# Patient Record
Sex: MALE | Race: WHITE | NOT HISPANIC OR LATINO | Employment: FULL TIME | URBAN - METROPOLITAN AREA
[De-identification: names, ages, dates, MRNs, and addresses within clinical notes are randomized per-mention and may not be internally consistent; named-entity substitution may affect disease eponyms.]

---

## 2017-05-05 ENCOUNTER — APPOINTMENT (OUTPATIENT)
Dept: PHYSICAL THERAPY | Facility: CLINIC | Age: 57
End: 2017-05-05
Payer: COMMERCIAL

## 2017-05-05 PROCEDURE — 97162 PT EVAL MOD COMPLEX 30 MIN: CPT

## 2017-05-08 ENCOUNTER — APPOINTMENT (OUTPATIENT)
Dept: PHYSICAL THERAPY | Facility: CLINIC | Age: 57
End: 2017-05-08
Payer: COMMERCIAL

## 2017-05-08 PROCEDURE — 97110 THERAPEUTIC EXERCISES: CPT

## 2017-05-08 PROCEDURE — 97140 MANUAL THERAPY 1/> REGIONS: CPT

## 2017-05-12 ENCOUNTER — APPOINTMENT (OUTPATIENT)
Dept: PHYSICAL THERAPY | Facility: CLINIC | Age: 57
End: 2017-05-12
Payer: COMMERCIAL

## 2017-05-12 PROCEDURE — 97110 THERAPEUTIC EXERCISES: CPT

## 2017-05-12 PROCEDURE — 97140 MANUAL THERAPY 1/> REGIONS: CPT

## 2017-05-16 ENCOUNTER — APPOINTMENT (OUTPATIENT)
Dept: PHYSICAL THERAPY | Facility: CLINIC | Age: 57
End: 2017-05-16
Payer: COMMERCIAL

## 2017-05-16 PROCEDURE — 97140 MANUAL THERAPY 1/> REGIONS: CPT

## 2017-05-16 PROCEDURE — 97110 THERAPEUTIC EXERCISES: CPT

## 2017-05-19 ENCOUNTER — APPOINTMENT (OUTPATIENT)
Dept: PHYSICAL THERAPY | Facility: CLINIC | Age: 57
End: 2017-05-19
Payer: COMMERCIAL

## 2017-05-23 ENCOUNTER — APPOINTMENT (OUTPATIENT)
Dept: PHYSICAL THERAPY | Facility: CLINIC | Age: 57
End: 2017-05-23
Payer: COMMERCIAL

## 2017-05-23 PROCEDURE — 97110 THERAPEUTIC EXERCISES: CPT

## 2017-05-23 PROCEDURE — 97140 MANUAL THERAPY 1/> REGIONS: CPT

## 2017-05-25 ENCOUNTER — APPOINTMENT (OUTPATIENT)
Dept: PHYSICAL THERAPY | Facility: CLINIC | Age: 57
End: 2017-05-25
Payer: COMMERCIAL

## 2017-05-25 PROCEDURE — 97110 THERAPEUTIC EXERCISES: CPT

## 2017-05-25 PROCEDURE — 97140 MANUAL THERAPY 1/> REGIONS: CPT

## 2017-05-30 ENCOUNTER — APPOINTMENT (OUTPATIENT)
Dept: PHYSICAL THERAPY | Facility: CLINIC | Age: 57
End: 2017-05-30
Payer: COMMERCIAL

## 2017-05-30 PROCEDURE — 97110 THERAPEUTIC EXERCISES: CPT

## 2017-05-30 PROCEDURE — 97140 MANUAL THERAPY 1/> REGIONS: CPT

## 2017-06-01 ENCOUNTER — APPOINTMENT (OUTPATIENT)
Dept: PHYSICAL THERAPY | Facility: CLINIC | Age: 57
End: 2017-06-01
Payer: COMMERCIAL

## 2017-06-01 PROCEDURE — 97140 MANUAL THERAPY 1/> REGIONS: CPT

## 2017-06-01 PROCEDURE — 97110 THERAPEUTIC EXERCISES: CPT

## 2017-06-06 ENCOUNTER — APPOINTMENT (OUTPATIENT)
Dept: PHYSICAL THERAPY | Facility: CLINIC | Age: 57
End: 2017-06-06
Payer: COMMERCIAL

## 2017-06-06 PROCEDURE — 97140 MANUAL THERAPY 1/> REGIONS: CPT

## 2017-06-06 PROCEDURE — 97110 THERAPEUTIC EXERCISES: CPT

## 2017-06-08 ENCOUNTER — APPOINTMENT (OUTPATIENT)
Dept: PHYSICAL THERAPY | Facility: CLINIC | Age: 57
End: 2017-06-08
Payer: COMMERCIAL

## 2017-06-08 PROCEDURE — 97110 THERAPEUTIC EXERCISES: CPT

## 2017-06-08 PROCEDURE — 97140 MANUAL THERAPY 1/> REGIONS: CPT

## 2017-06-13 ENCOUNTER — APPOINTMENT (OUTPATIENT)
Dept: PHYSICAL THERAPY | Facility: CLINIC | Age: 57
End: 2017-06-13
Payer: COMMERCIAL

## 2017-06-13 PROCEDURE — 97110 THERAPEUTIC EXERCISES: CPT

## 2017-06-13 PROCEDURE — 97140 MANUAL THERAPY 1/> REGIONS: CPT

## 2017-06-15 ENCOUNTER — APPOINTMENT (OUTPATIENT)
Dept: PHYSICAL THERAPY | Facility: CLINIC | Age: 57
End: 2017-06-15
Payer: COMMERCIAL

## 2017-06-15 PROCEDURE — 97140 MANUAL THERAPY 1/> REGIONS: CPT

## 2017-06-15 PROCEDURE — 97110 THERAPEUTIC EXERCISES: CPT

## 2017-06-20 ENCOUNTER — APPOINTMENT (OUTPATIENT)
Dept: PHYSICAL THERAPY | Facility: CLINIC | Age: 57
End: 2017-06-20
Payer: COMMERCIAL

## 2017-06-20 PROCEDURE — 97110 THERAPEUTIC EXERCISES: CPT

## 2017-06-20 PROCEDURE — 97140 MANUAL THERAPY 1/> REGIONS: CPT

## 2017-06-22 ENCOUNTER — APPOINTMENT (OUTPATIENT)
Dept: PHYSICAL THERAPY | Facility: CLINIC | Age: 57
End: 2017-06-22
Payer: COMMERCIAL

## 2017-06-27 ENCOUNTER — APPOINTMENT (OUTPATIENT)
Dept: PHYSICAL THERAPY | Facility: CLINIC | Age: 57
End: 2017-06-27
Payer: COMMERCIAL

## 2017-06-29 ENCOUNTER — APPOINTMENT (OUTPATIENT)
Dept: PHYSICAL THERAPY | Facility: CLINIC | Age: 57
End: 2017-06-29
Payer: COMMERCIAL

## 2017-07-05 ENCOUNTER — APPOINTMENT (OUTPATIENT)
Dept: PHYSICAL THERAPY | Facility: CLINIC | Age: 57
End: 2017-07-05
Payer: COMMERCIAL

## 2017-07-05 PROCEDURE — 97110 THERAPEUTIC EXERCISES: CPT

## 2017-07-05 PROCEDURE — 97140 MANUAL THERAPY 1/> REGIONS: CPT

## 2017-07-07 ENCOUNTER — APPOINTMENT (OUTPATIENT)
Dept: PHYSICAL THERAPY | Facility: CLINIC | Age: 57
End: 2017-07-07
Payer: COMMERCIAL

## 2017-07-07 PROCEDURE — 97110 THERAPEUTIC EXERCISES: CPT

## 2017-07-07 PROCEDURE — 97140 MANUAL THERAPY 1/> REGIONS: CPT

## 2017-07-11 ENCOUNTER — APPOINTMENT (OUTPATIENT)
Dept: PHYSICAL THERAPY | Facility: CLINIC | Age: 57
End: 2017-07-11
Payer: COMMERCIAL

## 2017-07-13 ENCOUNTER — APPOINTMENT (OUTPATIENT)
Dept: PHYSICAL THERAPY | Facility: CLINIC | Age: 57
End: 2017-07-13
Payer: COMMERCIAL

## 2017-07-13 PROCEDURE — 97140 MANUAL THERAPY 1/> REGIONS: CPT

## 2017-07-13 PROCEDURE — 97110 THERAPEUTIC EXERCISES: CPT

## 2017-07-14 ENCOUNTER — APPOINTMENT (OUTPATIENT)
Dept: PHYSICAL THERAPY | Facility: CLINIC | Age: 57
End: 2017-07-14
Payer: COMMERCIAL

## 2017-07-18 ENCOUNTER — APPOINTMENT (OUTPATIENT)
Dept: PHYSICAL THERAPY | Facility: CLINIC | Age: 57
End: 2017-07-18
Payer: COMMERCIAL

## 2017-07-18 PROCEDURE — 97110 THERAPEUTIC EXERCISES: CPT

## 2017-07-18 PROCEDURE — 97140 MANUAL THERAPY 1/> REGIONS: CPT

## 2017-07-20 ENCOUNTER — APPOINTMENT (OUTPATIENT)
Dept: PHYSICAL THERAPY | Facility: CLINIC | Age: 57
End: 2017-07-20
Payer: COMMERCIAL

## 2017-07-20 PROCEDURE — 97140 MANUAL THERAPY 1/> REGIONS: CPT

## 2017-07-20 PROCEDURE — 97110 THERAPEUTIC EXERCISES: CPT

## 2017-07-25 ENCOUNTER — APPOINTMENT (OUTPATIENT)
Dept: PHYSICAL THERAPY | Facility: CLINIC | Age: 57
End: 2017-07-25
Payer: COMMERCIAL

## 2017-07-25 PROCEDURE — 97110 THERAPEUTIC EXERCISES: CPT

## 2017-07-25 PROCEDURE — 97140 MANUAL THERAPY 1/> REGIONS: CPT

## 2017-07-27 ENCOUNTER — APPOINTMENT (OUTPATIENT)
Dept: PHYSICAL THERAPY | Facility: CLINIC | Age: 57
End: 2017-07-27
Payer: COMMERCIAL

## 2017-07-27 PROCEDURE — 97140 MANUAL THERAPY 1/> REGIONS: CPT

## 2017-07-27 PROCEDURE — 97110 THERAPEUTIC EXERCISES: CPT

## 2018-01-18 NOTE — MISCELLANEOUS
Message   Recorded as Task   Date: 05/05/2016 02:12 PM, Created By: Phong Coello   Task Name: Miscellaneous   Assigned To: [address] [facility], [address] [facility] Clinical,Team   Regarding Patient: Aleda Carrel, Status: Active   CommentJessica Scherer - 05 May 2016 2:12 PM     TASK CREATED  patient called and is returning your call from last week  Please call him  thank you   Rudi Schirmer - 05 May 2016 2:22 PM     TASK EDITED  Spoke to pt post R L5 AND S1 TFESI done on 4/22/16 with Dr Vazquez  Pt states that he has only 20% improvement in pain relief  Prior to the injection tramadol didn't touch the pain, since the injection the tramadol is taking the edge off  Pt inquiring whether Dr Vazquez will refill his tramadol or if he should have his PCP refill it  Instructed pt to contact his PCP since Dr Vazquez is not prescribing tramadol  Pt would be interested in trying another injection as he knows the next step is surgery and would like to repeat the injection first if Dr Vazquez recommends to  [facility] [employer] [address] - 05 May 2016 2:58 PM     TASK REPLIED TO: Previously Assigned To [address] [place] [address] to schedule 2nd injection at least 3 weeks from the date of the first injection  I would be willing to prescribe tramadol for him too if he does UDT and signs opioid agreement  [facility] - 06 May 2016 8:50 AM     TASK EDITED  S/W pt and advised of the same  Pt said he already made an appt today at 12:30 about the tramadol with his PCP  Pt was scheduled for right L5-S1 TFESI #2 for 5/13/16 at [address] [place] [address] at [facility]  Instr reviewed:  light brkf allowed then NPO 1 Hr prior to opro,  needed, denies blood thinners, c/b needed if sick/abx started prior to opro  Pt verbalized understanding of all instr  [facility] [employer] [address] - 06 May 2016 9:22 AM     TASK REPLIED TO: Previously Assigned To [place]  Thanks  Active Problems    1  Anxiety disorder (300 00) (F41 9)   2  Back pain (724 5) (M54 9)   3   Backache (724 5) (M54 9)   4  Benign essential hypertension (401 1) (I10)   5  Chronic lumbar pain (724 2,338 29) (M54 5,G89 29)   6  Chronic pain syndrome (338 4) (G89 4)   7  Essential hypertension (401 9) (I10)   8  Lumbar degenerative disc disease (722 52) (M51 36)   9  Lumbar disc herniation with radiculopathy (722 10) (M51 16)   10  Lumbar herniated disc (722 10) (M51 26)    Current Meds   1  ALPRAZolam 0 5 MG Oral Tablet; 1 every 8 hours prn; Therapy: 50THJ3555 to  Requested for: 40IAR5666 Recorded   2  Atenolol 100 MG Oral Tablet; 1 Every Day; Therapy: 85NBB1613 to  Requested for: 56TRQ7925 Recorded   3  Atenolol 100 MG Oral Tablet; Therapy: 85BYB1809 to (Last Cecy Rider)  Requested for: 25Mar2012 Ordered   4  Atenolol TABS; Therapy: (Recorded:03Mar2016) to Recorded   5  Cialis 20 MG Oral Tablet; 1 BY MOUTH PRE EPISODE;   Therapy: 07SGF9907 to  Requested for: 90OYB5778 Recorded   6  ClonazePAM 1 MG Oral Tablet; 1 po q 12 hours prn; Therapy: 61VNB8850 to  Requested for: 88LQT9000 Recorded   7  ClonazePAM 1 MG Oral Tablet; Therapy: 72TAX9078 to (Last Rx:09Mar2012)  Requested for: 00VYB3489 Ordered   8  Clotrimazole-Betamethasone 1-0 05 % External Cream; APPLY TWICE  A DAY FOR 10   DAYS; Therapy: 71UCC2506 to  Requested for: 84OSL5900 Recorded   9  Diclofenac Sodium 75 MG Oral Tablet Delayed Release; Take 1 tablet twice daily; Therapy: 69Psa8605 to (Last Rx:14Apr2016)  Requested for: 27Fdk9296 Ordered   10  Fluticasone Propionate 50 MCG/ACT Nasal Suspension; Therapy: 55Jzh5923 to (Last Eran Morin)  Requested for: 52Kas1399 Ordered   11  Gabapentin 300 MG Oral Capsule; TAKE 1 CAPSULE AT BEDTIME; Therapy: 09VOV7421 to (Evaluate:71Fkm9258); Last Rx:03Mar2016 Ordered   12  Ketoconazole 2 % External Cream;    Therapy: 79BUP6685 to (Last Rx:34Cce1935)  Requested for: 06Ntk3916 Ordered   13  Medrol (Isaac) 4 MG TABS (MethylPREDNISolone (Isaac)); TAKE AS DIRECTED ON    PATIENT INSTRUCTION CARD;     Therapy: 28OBM9034 to (Last Rx:03Mar2016) Ordered   14  TraMADol HCl - 50 MG Oral Tablet; Therapy: 72KIA5629 to (Last Rx:14Mar2012)  Requested for: 87ZLW7565 Ordered   15  Tramadol-Acetaminophen 37 5-325 MG Oral Tablet; 1 po q 6 hours prn; Therapy: 96BYI8437 to  Requested for: 43AWU0490 Recorded    Allergies    1   No Known Drug Allergies    Signatures   Electronically signed by : Muriel Mcdonald, ; May  6 2016 11:10AM EST                       (Author)

## 2021-07-08 ENCOUNTER — OFFICE VISIT (OUTPATIENT)
Dept: FAMILY MEDICINE CLINIC | Facility: CLINIC | Age: 61
End: 2021-07-08
Payer: COMMERCIAL

## 2021-07-08 VITALS
RESPIRATION RATE: 16 BRPM | HEART RATE: 63 BPM | TEMPERATURE: 97.3 F | SYSTOLIC BLOOD PRESSURE: 160 MMHG | OXYGEN SATURATION: 99 % | DIASTOLIC BLOOD PRESSURE: 92 MMHG | WEIGHT: 207 LBS | BODY MASS INDEX: 28.98 KG/M2 | HEIGHT: 71 IN

## 2021-07-08 DIAGNOSIS — J30.2 SEASONAL ALLERGIES: ICD-10-CM

## 2021-07-08 DIAGNOSIS — Z11.59 NEED FOR HEPATITIS C SCREENING TEST: ICD-10-CM

## 2021-07-08 DIAGNOSIS — Z13.6 SCREENING FOR CARDIOVASCULAR CONDITION: ICD-10-CM

## 2021-07-08 DIAGNOSIS — Z11.4 SCREENING FOR HIV (HUMAN IMMUNODEFICIENCY VIRUS): ICD-10-CM

## 2021-07-08 DIAGNOSIS — I10 BENIGN ESSENTIAL HYPERTENSION: Primary | ICD-10-CM

## 2021-07-08 DIAGNOSIS — Z12.5 SCREENING FOR PROSTATE CANCER: ICD-10-CM

## 2021-07-08 DIAGNOSIS — Z12.11 SCREEN FOR COLON CANCER: ICD-10-CM

## 2021-07-08 PROBLEM — N52.9 VASCULOGENIC ERECTILE DYSFUNCTION: Status: ACTIVE | Noted: 2021-07-08

## 2021-07-08 PROCEDURE — 99203 OFFICE O/P NEW LOW 30 MIN: CPT | Performed by: FAMILY MEDICINE

## 2021-07-08 PROCEDURE — 1036F TOBACCO NON-USER: CPT | Performed by: FAMILY MEDICINE

## 2021-07-08 PROCEDURE — 3008F BODY MASS INDEX DOCD: CPT | Performed by: FAMILY MEDICINE

## 2021-07-08 PROCEDURE — 3725F SCREEN DEPRESSION PERFORMED: CPT | Performed by: FAMILY MEDICINE

## 2021-07-08 RX ORDER — CETIRIZINE HYDROCHLORIDE 10 MG/1
10 TABLET ORAL DAILY
Start: 2021-07-08 | End: 2022-01-31 | Stop reason: ALTCHOICE

## 2021-07-08 RX ORDER — DIPHENOXYLATE HYDROCHLORIDE AND ATROPINE SULFATE 2.5; .025 MG/1; MG/1
1 TABLET ORAL DAILY
COMMUNITY

## 2021-07-08 RX ORDER — AMLODIPINE AND OLMESARTAN MEDOXOMIL 5; 20 MG/1; MG/1
1 TABLET ORAL DAILY
Qty: 30 TABLET | Refills: 1 | Status: SHIPPED | OUTPATIENT
Start: 2021-07-08 | End: 2021-08-02

## 2021-07-08 RX ORDER — METOPROLOL TARTRATE 100 MG/1
100 TABLET ORAL EVERY 12 HOURS SCHEDULED
COMMUNITY
End: 2021-07-08

## 2021-07-08 RX ORDER — MONTELUKAST SODIUM 10 MG/1
10 TABLET ORAL
Qty: 30 TABLET | Refills: 3 | Status: SHIPPED | OUTPATIENT
Start: 2021-07-08 | End: 2021-09-30

## 2021-07-08 NOTE — PATIENT INSTRUCTIONS
Weight Management   AMBULATORY CARE:   Why it is important to manage your weight:  Being overweight increases your risk of health conditions such as heart disease, high blood pressure, type 2 diabetes, and certain types of cancer  It can also increase your risk for osteoarthritis, sleep apnea, and other respiratory problems  Aim for a slow, steady weight loss  Even a small amount of weight loss can lower your risk of health problems  How to lose weight safely:  A safe and healthy way to lose weight is to eat fewer calories and get regular exercise  · You can lose up about 1 pound a week by decreasing the number of calories you eat by 500 calories each day  You can decrease calories by eating smaller portion sizes or by cutting out high-calorie foods  Read labels to find out how many calories are in the foods you eat  · You can also burn calories with exercise such as walking, swimming, or biking  You will be more likely to keep weight off if you make these changes part of your lifestyle  Exercise at least 30 minutes per day on most days of the week  You can also fit in more physical activity by taking the stairs instead of the elevator or parking farther away from stores  Ask your healthcare provider about the best exercise plan for you  Healthy meal plan for weight management:  A healthy meal plan includes a variety of foods, contains fewer calories, and helps you stay healthy  A healthy meal plan includes the following:     · Eat whole-grain foods more often  A healthy meal plan should contain fiber  Fiber is the part of grains, fruits, and vegetables that is not broken down by your body  Whole-grain foods are healthy and provide extra fiber in your diet  Some examples of whole-grain foods are whole-wheat breads and pastas, oatmeal, brown rice, and bulgur  · Eat a variety of vegetables every day  Include dark, leafy greens such as spinach, kale, fabiola greens, and mustard greens   Eat yellow and orange vegetables such as carrots, sweet potatoes, and winter squash  · Eat a variety of fruits every day  Choose fresh or canned fruit (canned in its own juice or light syrup) instead of juice  Fruit juice has very little or no fiber  · Eat low-fat dairy foods  Drink fat-free (skim) milk or 1% milk  Eat fat-free yogurt and low-fat cottage cheese  Try low-fat cheeses such as mozzarella and other reduced-fat cheeses  · Choose meat and other protein foods that are low in fat  Choose beans or other legumes such as split peas or lentils  Choose fish, skinless poultry (chicken or turkey), or lean cuts of red meat (beef or pork)  Before you cook meat or poultry, cut off any visible fat  · Use less fat and oil  Try baking foods instead of frying them  Add less fat, such as margarine, sour cream, regular salad dressing and mayonnaise to foods  Eat fewer high-fat foods  Some examples of high-fat foods include french fries, doughnuts, ice cream, and cakes  · Eat fewer sweets  Limit foods and drinks that are high in sugar  This includes candy, cookies, regular soda, and sweetened drinks  Ways to decrease calories:   · Eat smaller portions  ? Use a small plate with smaller servings  ? Do not eat second helpings  ? When you eat at a restaurant, ask for a box and place half of your meal in the box before you eat  ? Share an entrée with someone else  · Replace high-calorie snacks with healthy, low-calorie snacks  ? Choose fresh fruit, vegetables, fat-free rice cakes, or air-popped popcorn instead of potato chips, nuts, or chocolate  ? Choose water or calorie-free drinks instead of soda or sweetened drinks  · Do not shop for groceries when you are hungry  You may be more likely to make unhealthy food choices  Take a grocery list of healthy foods and shop after you have eaten  · Eat regular meals  Do not skip meals  Skipping meals can lead to overeating later in the day   This can make it harder for you to lose weight  Eat a healthy snack in place of a meal if you do not have time to eat a regular meal  Talk with a dietitian to help you create a meal plan and schedule that is right for you  Other things to consider as you try to lose weight:   · Be aware of situations that may give you the urge to overeat, such as eating while watching television  Find ways to avoid these situations  For example, read a book, go for a walk, or do crafts  · Meet with a weight loss support group or friends who are also trying to lose weight  This may help you stay motivated to continue working on your weight loss goals  © Copyright [address] Information is for End User's use only and may not be sold, redistributed or otherwise used for commercial purposes  All illustrations and images included in CareNotes® are the copyrighted property of [employer]  or [facility] Edinson Mayer   The above information is an  only  It is not intended as medical advice for individual conditions or treatments  Talk to your doctor, nurse or pharmacist before following any medical regimen to see if it is safe and effective for you

## 2021-07-08 NOTE — PROGRESS NOTES
Assessment/Plan:    1  Benign essential hypertension  -     amlodipine-olmesartan (JAG) 5-20 MG; Take 1 tablet by mouth daily  -     Comprehensive metabolic panel; Future  -     Comprehensive metabolic panel    2  Screening for cardiovascular condition  -     Lipid Panel with Direct LDL reflex; Future  -     Lipid Panel with Direct LDL reflex    3  Screening for prostate cancer  -     PSA, Total Screen; Future    4  Need for hepatitis C screening test  -     Hepatitis C antibody; Future  -     Hepatitis C antibody    5  Screening for HIV (human immunodeficiency virus)  -     HIV 1/2 Antigen/Antibody (4th Generation) w Reflex [facility]; Future  -     [facility]- Human Immunodeficiency Virus 1/2 Antigen / Antibody ( Fourth Generation) with Reflex Testing; Future    6  BMI 28 0-28 9,adult    7  Screen for colon cancer  -     Ambulatory referral to Gastroenterology; Future    8  Seasonal allergies  -     cetirizine (ZyrTEC) 10 mg tablet; Take 1 tablet (10 mg total) by mouth daily  -     montelukast (SINGULAIR) 10 mg tablet; Take 1 tablet (10 mg total) by mouth daily at bedtime            Patient Instructions       Weight Management   AMBULATORY CARE:   Why it is important to manage your weight:  Being overweight increases your risk of health conditions such as heart disease, high blood pressure, type 2 diabetes, and certain types of cancer  It can also increase your risk for osteoarthritis, sleep apnea, and other respiratory problems  Aim for a slow, steady weight loss  Even a small amount of weight loss can lower your risk of health problems  How to lose weight safely:  A safe and healthy way to lose weight is to eat fewer calories and get regular exercise  · You can lose up about 1 pound a week by decreasing the number of calories you eat by 500 calories each day  You can decrease calories by eating smaller portion sizes or by cutting out high-calorie foods   Read labels to find out how many calories are in the foods you eat  · You can also burn calories with exercise such as walking, swimming, or biking  You will be more likely to keep weight off if you make these changes part of your lifestyle  Exercise at least 30 minutes per day on most days of the week  You can also fit in more physical activity by taking the stairs instead of the elevator or parking farther away from stores  Ask your healthcare provider about the best exercise plan for you  Healthy meal plan for weight management:  A healthy meal plan includes a variety of foods, contains fewer calories, and helps you stay healthy  A healthy meal plan includes the following:     · Eat whole-grain foods more often  A healthy meal plan should contain fiber  Fiber is the part of grains, fruits, and vegetables that is not broken down by your body  Whole-grain foods are healthy and provide extra fiber in your diet  Some examples of whole-grain foods are whole-wheat breads and pastas, oatmeal, brown rice, and bulgur  · Eat a variety of vegetables every day  Include dark, leafy greens such as spinach, kale, fabiola greens, and mustard greens  Eat yellow and orange vegetables such as carrots, sweet potatoes, and winter squash  · Eat a variety of fruits every day  Choose fresh or canned fruit (canned in its own juice or light syrup) instead of juice  Fruit juice has very little or no fiber  · Eat low-fat dairy foods  Drink fat-free (skim) milk or 1% milk  Eat fat-free yogurt and low-fat cottage cheese  Try low-fat cheeses such as mozzarella and other reduced-fat cheeses  · Choose meat and other protein foods that are low in fat  Choose beans or other legumes such as split peas or lentils  Choose fish, skinless poultry (chicken or turkey), or lean cuts of red meat (beef or pork)  Before you cook meat or poultry, cut off any visible fat  · Use less fat and oil  Try baking foods instead of frying them   Add less fat, such as margarine, sour cream, regular salad dressing and mayonnaise to foods  Eat fewer high-fat foods  Some examples of high-fat foods include french fries, doughnuts, ice cream, and cakes  · Eat fewer sweets  Limit foods and drinks that are high in sugar  This includes candy, cookies, regular soda, and sweetened drinks  Ways to decrease calories:   · Eat smaller portions  ? Use a small plate with smaller servings  ? Do not eat second helpings  ? When you eat at a restaurant, ask for a box and place half of your meal in the box before you eat  ? Share an entrée with someone else  · Replace high-calorie snacks with healthy, low-calorie snacks  ? Choose fresh fruit, vegetables, fat-free rice cakes, or air-popped popcorn instead of potato chips, nuts, or chocolate  ? Choose water or calorie-free drinks instead of soda or sweetened drinks  · Do not shop for groceries when you are hungry  You may be more likely to make unhealthy food choices  Take a grocery list of healthy foods and shop after you have eaten  · Eat regular meals  Do not skip meals  Skipping meals can lead to overeating later in the day  This can make it harder for you to lose weight  Eat a healthy snack in place of a meal if you do not have time to eat a regular meal  Talk with a dietitian to help you create a meal plan and schedule that is right for you  Other things to consider as you try to lose weight:   · Be aware of situations that may give you the urge to overeat, such as eating while watching television  Find ways to avoid these situations  For example, read a book, go for a walk, or do crafts  · Meet with a weight loss support group or friends who are also trying to lose weight  This may help you stay motivated to continue working on your weight loss goals  © Copyright [address] Information is for End User's use only and may not be sold, redistributed or otherwise used for commercial purposes  All illustrations and images included in CareNotes® are the copyrighted property of [employer]  or [facility] Edinson Mayer   The above information is an  only  It is not intended as medical advice for individual conditions or treatments  Talk to your doctor, nurse or pharmacist before following any medical regimen to see if it is safe and effective for you  Return in about 3 weeks (around 7/29/2021) for Annual physical     Subjective:      Patient ID: Jose Grove is a 63 y o  male  Chief Complaint   Patient presents with    NP Blood Pressure Med     mz cma    Establish Care       Pt is here for the first time sched for BP medication  Pt used to see Mustapha Hernandez  Pt is out of his BP meds - states when he is on the med his BP is under control - pt states he would like to change the med - thinks it is gioving him tinnitus    Pt states he suffers from allergies      The following portions of the patient's history were reviewed and updated as appropriate: allergies, current medications, past family history, past medical history, past social history, past surgical history and problem list     Review of Systems   Constitutional: Negative for activity change, appetite change, chills, diaphoresis, fatigue, fever and unexpected weight change  HENT: Negative for congestion, dental problem, ear pain, mouth sores, sinus pressure, sinus pain, sore throat and trouble swallowing  Eyes: Negative for photophobia, discharge and itching  Respiratory: Negative for apnea, chest tightness and shortness of breath  Cardiovascular: Negative for chest pain, palpitations and leg swelling  Gastrointestinal: Negative for abdominal distention, abdominal pain, blood in stool, nausea and vomiting  Endocrine: Negative for cold intolerance, heat intolerance, polydipsia, polyphagia and polyuria  Genitourinary: Negative for difficulty urinating  Musculoskeletal: Negative for arthralgias     Skin: Negative for color change and wound  Neurological: Negative for dizziness, syncope, speech difficulty and headaches  Hematological: Negative for adenopathy  Psychiatric/Behavioral: Negative for agitation and behavioral problems  Current Outpatient Medications   Medication Sig Dispense Refill    amlodipine-olmesartan (JAG) 5-20 MG Take 1 tablet by mouth daily 30 tablet 1    cetirizine (ZyrTEC) 10 mg tablet Take 1 tablet (10 mg total) by mouth daily      montelukast (SINGULAIR) 10 mg tablet Take 1 tablet (10 mg total) by mouth daily at bedtime 30 tablet 3    multivitamin (THERAGRAN) TABS Take 1 tablet by mouth daily       No current facility-administered medications for this visit  Objective:    /92   Pulse 63   Temp (!) 97 3 °F (36 3 °C)   Resp 16   Ht 5' 11" (1 803 m)   Wt 93 9 kg (207 lb)   SpO2 99%   BMI 28 87 kg/m²        Physical Exam  Vitals and nursing note reviewed  Constitutional:       General: He is not in acute distress  Appearance: He is well-developed  He is not diaphoretic  HENT:      Head: Normocephalic and atraumatic  Right Ear: External ear normal       Left Ear: External ear normal       Nose: Nose normal       Mouth/Throat:      Pharynx: No oropharyngeal exudate  Eyes:      General: No scleral icterus  Right eye: No discharge  Left eye: No discharge  Pupils: Pupils are equal, round, and reactive to light  Neck:      Thyroid: No thyromegaly  Cardiovascular:      Rate and Rhythm: Normal rate  Heart sounds: Normal heart sounds  No murmur heard  Pulmonary:      Effort: Pulmonary effort is normal  No respiratory distress  Breath sounds: Normal breath sounds  No wheezing  Abdominal:      General: Bowel sounds are normal  There is no distension  Palpations: Abdomen is soft  There is no mass  Tenderness: There is no abdominal tenderness  There is no guarding or rebound     Musculoskeletal:         General: Normal range of motion  Skin:     General: Skin is warm and dry  Findings: No erythema or rash  Neurological:      Mental Status: He is alert  Coordination: Coordination normal       Deep Tendon Reflexes: Reflexes normal    Psychiatric:         Behavior: Behavior normal                 Governor DO Anupam  BMI Counseling: Body mass index is 28 87 kg/m²  The BMI is above normal  Nutrition recommendations include reducing portion sizes

## 2021-07-29 LAB
HIV 1+2 AB+HIV1 P24 AG SERPL QL IA: NON REACTIVE
PSA SERPL-MCNC: 1.8 NG/ML (ref 0–4)

## 2021-08-04 ENCOUNTER — RA CDI HCC (OUTPATIENT)
Dept: OTHER | Facility: HOSPITAL | Age: 61
End: 2021-08-04

## 2021-08-04 NOTE — PROGRESS NOTES
[facility]  coding opportunities          Chart reviewed, no opportunity found: CHART REVIEWED, NO OPPORTUNITY FOUND                     Patients insurance company: [employer] (Medicare and Commercial for [employer] and [employer])

## 2021-08-11 ENCOUNTER — OFFICE VISIT (OUTPATIENT)
Dept: FAMILY MEDICINE CLINIC | Facility: CLINIC | Age: 61
End: 2021-08-11
Payer: COMMERCIAL

## 2021-08-11 VITALS
DIASTOLIC BLOOD PRESSURE: 80 MMHG | TEMPERATURE: 98.5 F | WEIGHT: 208 LBS | SYSTOLIC BLOOD PRESSURE: 120 MMHG | HEART RATE: 104 BPM | BODY MASS INDEX: 29.78 KG/M2 | HEIGHT: 70 IN | RESPIRATION RATE: 16 BRPM | OXYGEN SATURATION: 94 %

## 2021-08-11 DIAGNOSIS — H93.13 TINNITUS OF BOTH EARS: ICD-10-CM

## 2021-08-11 DIAGNOSIS — Z00.00 WELL ADULT EXAM: Primary | ICD-10-CM

## 2021-08-11 DIAGNOSIS — I10 BENIGN ESSENTIAL HYPERTENSION: ICD-10-CM

## 2021-08-11 PROCEDURE — 1036F TOBACCO NON-USER: CPT | Performed by: FAMILY MEDICINE

## 2021-08-11 PROCEDURE — 3079F DIAST BP 80-89 MM HG: CPT | Performed by: FAMILY MEDICINE

## 2021-08-11 PROCEDURE — 3008F BODY MASS INDEX DOCD: CPT | Performed by: FAMILY MEDICINE

## 2021-08-11 PROCEDURE — 3074F SYST BP LT 130 MM HG: CPT | Performed by: FAMILY MEDICINE

## 2021-08-11 PROCEDURE — 99396 PREV VISIT EST AGE 40-64: CPT | Performed by: FAMILY MEDICINE

## 2021-08-11 PROCEDURE — 3725F SCREEN DEPRESSION PERFORMED: CPT | Performed by: FAMILY MEDICINE

## 2021-08-11 RX ORDER — AMLODIPINE AND OLMESARTAN MEDOXOMIL 5; 20 MG/1; MG/1
1 TABLET ORAL DAILY
Qty: 90 TABLET | Refills: 1 | Status: SHIPPED | OUTPATIENT
Start: 2021-08-11 | End: 2022-01-31 | Stop reason: SDUPTHER

## 2021-08-11 NOTE — PROGRESS NOTES
[address]    NAME: Latricia Moon  AGE: 63 y o  SEX: male  : 1960     DATE: 2021    Assessment and Plan     1  Well adult exam    2  Benign essential hypertension  -     amlodipine-olmesartan (JAG) 5-20 MG; Take 1 tablet by mouth daily  -     Comprehensive metabolic panel; Future; Expected date: 2022  -     Comprehensive metabolic panel    3  Tinnitus of both ears  -     Ambulatory referral to Audiology; Future        · Patient Counseling:   · Nutrition: Stressed importance of a well balanced diet, moderation of sodium/saturated fat, caloric balance and sufficient intake of fiber  · Exercise: Stressed the importance of regular exercise with a goal of 150 minutes per week  · Dental Health: Discussed daily flossing and brushing and regular dental visits     · Immunizations reviewed: Risks and Benefits discussed  · Discussed benefits of:  Prostate Cancer Screening  and Screening labs   BMI Counseling: Body mass index is 30 06 kg/m²  Discussed with patient's BMI with him  The BMI is above normal  Nutrition recommendations include reducing portion sizes  Return in about 6 months (around 2022) for Recheck BP          Chief Complaint     Chief Complaint   Patient presents with    Physical Exam     wmcma       History of Present Illness     Pt is here for a full physical  Pt made a misteak and only had a few of the labs done by accident    Pt states he has ringing in his ears for years      Well Adult Physical   Patient here for a comprehensive physical exam       Diet and Physical Activity  Diet: well balanced diet  Exercise: occasionally      Depression Screen  PHQ-9 Depression Screening    PHQ-9:   Frequency of the following problems over the past two weeks:      Little interest or pleasure in doing things: 0 - not at all  Feeling down, depressed, or hopeless: 0 - not at all  PHQ-2 Score: 0 General Health  Hearing: Normal:  bilateral  Vision: wears glasses  Dental: regular dental visits    Reproductive Health  No issues       The following portions of the patient's history were reviewed and updated as appropriate: allergies, current medications, past family history, past medical history, past social history, past surgical history and problem list     Review of Systems     Review of Systems   Constitutional: Negative for activity change, appetite change, chills, diaphoresis, fatigue, fever and unexpected weight change  HENT: Positive for tinnitus  Negative for congestion, dental problem, ear pain, mouth sores, sinus pressure, sinus pain, sore throat and trouble swallowing  Eyes: Negative for photophobia, discharge and itching  Respiratory: Negative for apnea, chest tightness and shortness of breath  Cardiovascular: Negative for chest pain, palpitations and leg swelling  Gastrointestinal: Negative for abdominal distention, abdominal pain, blood in stool, nausea and vomiting  Endocrine: Negative for cold intolerance, heat intolerance, polydipsia, polyphagia and polyuria  Genitourinary: Negative for difficulty urinating  Musculoskeletal: Negative for arthralgias  Skin: Negative for color change and wound  Neurological: Negative for dizziness, syncope, speech difficulty and headaches  Hematological: Negative for adenopathy  Psychiatric/Behavioral: Negative for agitation and behavioral problems         Past Medical History     Past Medical History:   Diagnosis Date    Herniated lumbar intervertebral disc        Past Surgical History     Past Surgical History:   Procedure Laterality Date    ENDOSCOPIC HEMILAMINOTOMY W/ DISCECTOMY LUMBAR      ROTATOR CUFF REPAIR Right        Social History     Social History     Socioeconomic History    Marital status: /Civil Union     Spouse name: None    Number of children: None    Years of education: None    Highest education level: None   Occupational History    None   Tobacco Use    Smoking status: Former Smoker     Types: Cigarettes     Quit date: 2013     Years since quittin 0    Smokeless tobacco: Never Used   Vaping Use    Vaping Use: Former   Substance and Sexual Activity    Alcohol use: None    Drug use: None    Sexual activity: None   Other Topics Concern    None   Social History Narrative    None     Social Determinants of Health     Financial Resource Strain:     Difficulty of Paying Living Expenses:    Food Insecurity:     Worried About Running Out of Food in the Last Year:     Ran Out of Food in the Last Year:    Transportation Needs:     Lack of Transportation (Medical):      Lack of Transportation (Non-Medical):    Physical Activity:     Days of Exercise per Week:     Minutes of Exercise per Session:    Stress:     Feeling of Stress :    Social Connections:     Frequency of Communication with Friends and Family:     Frequency of Social Gatherings with Friends and Family:     Attends Bahai Services:     Active Member of Clubs or Organizations:     Attends Club or Organization Meetings:     Marital Status:    Intimate Partner Violence:     Fear of Current or Ex-Partner:     Emotionally Abused:     Physically Abused:     Sexually Abused:        Family History     Family History   Problem Relation Age of Onset    No Known Problems Mother     Bone cancer Father     Mental illness Neg Hx        Current Medications       Current Outpatient Medications:     amlodipine-olmesartan (JAG) 5-20 MG, Take 1 tablet by mouth daily, Disp: 90 tablet, Rfl: 1    cetirizine (ZyrTEC) 10 mg tablet, Take 1 tablet (10 mg total) by mouth daily, Disp: , Rfl:     multivitamin (THERAGRAN) TABS, Take 1 tablet by mouth daily, Disp: , Rfl:     montelukast (SINGULAIR) 10 mg tablet, Take 1 tablet (10 mg total) by mouth daily at bedtime (Patient not taking: Reported on 2021), Disp: 30 tablet, Rfl: 3     Allergies No Known Allergies    Objective     /80   Pulse 104   Temp 98 5 °F (36 9 °C)   Resp 16   Ht 5' 9 75" (1 772 m)   Wt 94 3 kg (208 lb)   SpO2 94%   BMI 30 06 kg/m²      Physical Exam  Vitals and nursing note reviewed  Constitutional:       General: He is not in acute distress  Appearance: He is well-developed  He is not diaphoretic  HENT:      Head: Normocephalic and atraumatic  Right Ear: External ear normal       Left Ear: External ear normal       Nose: Nose normal       Mouth/Throat:      Pharynx: No oropharyngeal exudate  Eyes:      General: No scleral icterus  Right eye: No discharge  Left eye: No discharge  Pupils: Pupils are equal, round, and reactive to light  Neck:      Thyroid: No thyromegaly  Cardiovascular:      Rate and Rhythm: Normal rate  Heart sounds: Normal heart sounds  No murmur heard  Pulmonary:      Effort: Pulmonary effort is normal  No respiratory distress  Breath sounds: Normal breath sounds  No wheezing  Abdominal:      General: Bowel sounds are normal  There is no distension  Palpations: Abdomen is soft  There is no mass  Tenderness: There is no abdominal tenderness  There is no guarding or rebound  Genitourinary:     Prostate: Normal    Musculoskeletal:         General: Normal range of motion  Skin:     General: Skin is warm and dry  Findings: No erythema or rash  Neurological:      Mental Status: He is alert        Coordination: Coordination normal       Deep Tendon Reflexes: Reflexes normal    Psychiatric:         Behavior: Behavior normal             Visual Acuity Screening    Right eye Left eye Both eyes   Without correction: 20/30 20 /40 20/30   With correction:              Yasmine Phillips, [address]

## 2021-08-23 ENCOUNTER — OFFICE VISIT (OUTPATIENT)
Dept: AUDIOLOGY | Facility: CLINIC | Age: 61
End: 2021-08-23
Payer: COMMERCIAL

## 2021-08-23 DIAGNOSIS — H93.13 TINNITUS OF BOTH EARS: ICD-10-CM

## 2021-08-23 DIAGNOSIS — H90.5 SENSORY HEARING LOSS, UNILATERAL: Primary | ICD-10-CM

## 2021-08-23 PROCEDURE — 92567 TYMPANOMETRY: CPT | Performed by: AUDIOLOGIST

## 2021-08-23 PROCEDURE — 92557 COMPREHENSIVE HEARING TEST: CPT | Performed by: AUDIOLOGIST

## 2021-08-23 NOTE — PROGRESS NOTES
HEARING EVALUATION    Name:  Darian Mcleod  :  1960  Age:  63 y o  Date of Evaluation: 21     History: Tinnitus  Reason for visit: Darian Mcleod is being seen today at the request of Dr Zora Zhao for an evaluation of hearing  Patient reports subjective changes to his hearing sensitivities that began several years ago  Patient notes several years ago undergoing a hearing test that revealed worse hearing in his right ear vs his left ear  Patient denies any difficulties with his hearing but more with his tinnitus  Patient notes his tinnitus has been long standing for several years but notes over the past few months his tinnitus has become louder  Patient notes his tinnitus is constant and occurs in both ears; higher and more present in the right ear vs left ear  Patient does admit to 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  Patient denies any use of hearing protection in these settings  Denies any loud lawn care equipment or occupational noise  Does admit to target shooting with the use of muff style hearing protection  Patient denies any recent ear infections but does note when he was younger having multiple ear infections  Family history significant for his father having hearing loss due to a perforated ear drum  Patient denies any otalgia or dizziness  Patient positive for several sinus infections  EVALUATION:    Otoscopic Evaluation:   Right Ear: Clear and healthy ear canal and tympanic membrane   Left Ear: Clear and healthy ear canal and tympanic membrane    Tympanometry:   Right: Type A - normal middle ear pressure and compliance   Left: Type A - normal middle ear pressure and compliance    Audiogram Results:  Pure tone testing revealed a normal sloping to severe sensorineural hearing loss in the  left  ear and a normal sloping to modeartely severe sensorineural hearing loss in the  right ear   SRT and PTA are in agreement indicating good test reliability  Word recognition scores were excellent bilaterally  *see attached audiogram      RECOMMENDATIONS:  Annual hearing eval, Return to [facility]  for F/U, Hearing Aid Evaluation and Copy to Patient/Caregiver    PATIENT EDUCATION:   Discussed results and recommendations with the patient at length  Overall patient is a hearing aid candidate, bilaterally  Briefly discussed hearing aids and tinnitus  Patient aware no hearing aid benefits through his insurance  Patient aware starting point for hearing aids $3800 00  Patient scheduled to return for an HAE with myself  Questions were addressed and the patient was encouraged to contact our department should concerns arise        Odalis Dia , CCC-A  Clinical Audiologist

## 2021-09-30 DIAGNOSIS — J30.2 SEASONAL ALLERGIES: ICD-10-CM

## 2021-09-30 RX ORDER — MONTELUKAST SODIUM 10 MG/1
TABLET ORAL
Qty: 90 TABLET | Refills: 1 | Status: SHIPPED | OUTPATIENT
Start: 2021-09-30 | End: 2022-01-31 | Stop reason: ALTCHOICE

## 2021-12-22 ENCOUNTER — OFFICE VISIT (OUTPATIENT)
Dept: FAMILY MEDICINE CLINIC | Facility: CLINIC | Age: 61
End: 2021-12-22
Payer: COMMERCIAL

## 2021-12-22 VITALS
SYSTOLIC BLOOD PRESSURE: 142 MMHG | OXYGEN SATURATION: 97 % | DIASTOLIC BLOOD PRESSURE: 86 MMHG | HEIGHT: 70 IN | HEART RATE: 107 BPM | BODY MASS INDEX: 28.49 KG/M2 | RESPIRATION RATE: 17 BRPM | TEMPERATURE: 98 F | WEIGHT: 199 LBS

## 2021-12-22 DIAGNOSIS — F41.0 PANIC ATTACK: ICD-10-CM

## 2021-12-22 DIAGNOSIS — F41.9 ANXIETY: Primary | ICD-10-CM

## 2021-12-22 PROCEDURE — 3077F SYST BP >= 140 MM HG: CPT | Performed by: FAMILY MEDICINE

## 2021-12-22 PROCEDURE — 99213 OFFICE O/P EST LOW 20 MIN: CPT | Performed by: FAMILY MEDICINE

## 2021-12-22 PROCEDURE — 3079F DIAST BP 80-89 MM HG: CPT | Performed by: FAMILY MEDICINE

## 2021-12-22 PROCEDURE — 3008F BODY MASS INDEX DOCD: CPT | Performed by: FAMILY MEDICINE

## 2021-12-22 PROCEDURE — 1036F TOBACCO NON-USER: CPT | Performed by: FAMILY MEDICINE

## 2021-12-22 RX ORDER — CLONAZEPAM 0.25 MG/1
0.25 TABLET, ORALLY DISINTEGRATING ORAL 2 TIMES DAILY PRN
Qty: 30 TABLET | Refills: 0 | Status: SHIPPED | OUTPATIENT
Start: 2021-12-22 | End: 2022-01-10 | Stop reason: SDUPTHER

## 2022-01-02 ENCOUNTER — HOSPITAL ENCOUNTER (EMERGENCY)
Facility: HOSPITAL | Age: 62
Discharge: HOME | End: 2022-01-02
Attending: EMERGENCY MEDICINE
Payer: COMMERCIAL

## 2022-01-02 ENCOUNTER — APPOINTMENT (EMERGENCY)
Dept: RADIOLOGY | Facility: HOSPITAL | Age: 62
End: 2022-01-02
Payer: COMMERCIAL

## 2022-01-02 VITALS
SYSTOLIC BLOOD PRESSURE: 124 MMHG | OXYGEN SATURATION: 95 % | BODY MASS INDEX: 28.76 KG/M2 | WEIGHT: 199 LBS | TEMPERATURE: 98.9 F | RESPIRATION RATE: 18 BRPM | DIASTOLIC BLOOD PRESSURE: 70 MMHG | HEART RATE: 75 BPM

## 2022-01-02 DIAGNOSIS — R42 LIGHTHEADEDNESS: ICD-10-CM

## 2022-01-02 DIAGNOSIS — U07.1 COVID-19: Primary | ICD-10-CM

## 2022-01-02 DIAGNOSIS — R55 SYNCOPE: ICD-10-CM

## 2022-01-02 LAB
ALBUMIN SERPL BCP-MCNC: 3.7 G/DL (ref 3.5–5)
ALP SERPL-CCNC: 79 U/L (ref 46–116)
ALT SERPL W P-5'-P-CCNC: 30 U/L (ref 12–78)
ANION GAP SERPL CALCULATED.3IONS-SCNC: 10 MMOL/L (ref 4–13)
AST SERPL W P-5'-P-CCNC: 17 U/L (ref 5–45)
BASOPHILS # BLD AUTO: 0.03 THOUSANDS/ΜL (ref 0–0.1)
BASOPHILS NFR BLD AUTO: 0 % (ref 0–1)
BILIRUB SERPL-MCNC: 0.31 MG/DL (ref 0.2–1)
BUN SERPL-MCNC: 17 MG/DL (ref 5–25)
CALCIUM SERPL-MCNC: 8.4 MG/DL (ref 8.3–10.1)
CARDIAC TROPONIN I PNL SERPL HS: <2 NG/L
CHLORIDE SERPL-SCNC: 102 MMOL/L (ref 100–108)
CO2 SERPL-SCNC: 27 MMOL/L (ref 21–32)
CREAT SERPL-MCNC: 1.15 MG/DL (ref 0.6–1.3)
EOSINOPHIL # BLD AUTO: 0.09 THOUSAND/ΜL (ref 0–0.61)
EOSINOPHIL NFR BLD AUTO: 1 % (ref 0–6)
ERYTHROCYTE [DISTWIDTH] IN BLOOD BY AUTOMATED COUNT: 13.2 % (ref 11.6–15.1)
GFR SERPL CREATININE-BSD FRML MDRD: 69 ML/MIN/1.73SQ M
GLUCOSE SERPL-MCNC: 117 MG/DL (ref 65–140)
HCT VFR BLD AUTO: 41.4 % (ref 36.5–49.3)
HGB BLD-MCNC: 13.3 G/DL (ref 12–17)
IMM GRANULOCYTES # BLD AUTO: 0.03 THOUSAND/UL (ref 0–0.2)
IMM GRANULOCYTES NFR BLD AUTO: 0 % (ref 0–2)
LYMPHOCYTES # BLD AUTO: 0.78 THOUSANDS/ΜL (ref 0.6–4.47)
LYMPHOCYTES NFR BLD AUTO: 7 % (ref 14–44)
MCH RBC QN AUTO: 29.9 PG (ref 26.8–34.3)
MCHC RBC AUTO-ENTMCNC: 32.1 G/DL (ref 31.4–37.4)
MCV RBC AUTO: 93 FL (ref 82–98)
MONOCYTES # BLD AUTO: 1.17 THOUSAND/ΜL (ref 0.17–1.22)
MONOCYTES NFR BLD AUTO: 11 % (ref 4–12)
NEUTROPHILS # BLD AUTO: 8.49 THOUSANDS/ΜL (ref 1.85–7.62)
NEUTS SEG NFR BLD AUTO: 81 % (ref 43–75)
NRBC BLD AUTO-RTO: 0 /100 WBCS
PLATELET # BLD AUTO: 163 THOUSANDS/UL (ref 149–390)
PMV BLD AUTO: 9.5 FL (ref 8.9–12.7)
POTASSIUM SERPL-SCNC: 4.1 MMOL/L (ref 3.5–5.3)
PROT SERPL-MCNC: 6.9 G/DL (ref 6.4–8.2)
RBC # BLD AUTO: 4.45 MILLION/UL (ref 3.88–5.62)
SODIUM SERPL-SCNC: 139 MMOL/L (ref 136–145)
WBC # BLD AUTO: 10.5 THOUSAND/UL (ref 4.31–10.16)

## 2022-01-02 PROCEDURE — 85025 COMPLETE CBC W/AUTO DIFF WBC: CPT | Performed by: EMERGENCY MEDICINE

## 2022-01-02 PROCEDURE — 36415 COLL VENOUS BLD VENIPUNCTURE: CPT | Performed by: EMERGENCY MEDICINE

## 2022-01-02 PROCEDURE — 93005 ELECTROCARDIOGRAM TRACING: CPT

## 2022-01-02 PROCEDURE — 99284 EMERGENCY DEPT VISIT MOD MDM: CPT

## 2022-01-02 PROCEDURE — 99284 EMERGENCY DEPT VISIT MOD MDM: CPT | Performed by: EMERGENCY MEDICINE

## 2022-01-02 PROCEDURE — 80053 COMPREHEN METABOLIC PANEL: CPT | Performed by: EMERGENCY MEDICINE

## 2022-01-02 PROCEDURE — 84484 ASSAY OF TROPONIN QUANT: CPT | Performed by: EMERGENCY MEDICINE

## 2022-01-02 PROCEDURE — 71045 X-RAY EXAM CHEST 1 VIEW: CPT

## 2022-01-02 NOTE — Clinical Note
Neha Perrin was seen and treated in our emergency department on 1/2/2022  Diagnosis: Vivek Dexter    He may return on this date: 01/11/2022         If you have any questions or concerns, please don't hesitate to call        Iveth Ohara MD    ______________________________           _______________          _______________  Hospital Representative                              Date                                Time

## 2022-01-02 NOTE — DISCHARGE INSTRUCTIONS
Continue good oral hydration  Continue to take precautions, sitting down or lying down if you are feeling lightheaded or like you are going to pass out  If you have recurrent episodes of this, chest pain preceding an episode, persistent nausea and vomiting, any significant shortness of breath, return to the emergency department for further evaluation  Your EKG, chest x-ray, and troponin were all normal   Continue home isolation for an additional 8 days for COVID-19

## 2022-01-03 NOTE — ED PROVIDER NOTES
History  Chief Complaint   Patient presents with    Syncope     States he did 2 home rapid tests positive for covid  States " blacked out " en route to ED and wife confirms syncope  Patient is pale, BP 95/53    Biological Exposure     HPI  Patient is a 63year old male presenting for evaluation of cough, nausea, vomiting, decreased appetite, syncopal episode  Patient states that he has been having symptoms for several days, took a rapid test at home today which was positive, and was being driven to Urgent Care by his wife for further evaluation when he had episode of lightheadedness followed by syncopal episode  Patient was pale and remained lightheaded following episode  Patient states he has had poor appetite and has been eating and drinking very little  Patient denies chest pain, abdominal pain, preceding palpitations or shortness of breath  Patient states that he feels slightly fatigued in ED but otherwise minimally symptomatic and back to baseline  Patient denies past cardiac history, denies history of decreased exercise tolerance, exertional syncope, dyspnea on exertion  Prior to Admission Medications   Prescriptions Last Dose Informant Patient Reported?  Taking?   amlodipine-olmesartan (JAG) 5-20 MG   No No   Sig: Take 1 tablet by mouth daily   cetirizine (ZyrTEC) 10 mg tablet   No No   Sig: Take 1 tablet (10 mg total) by mouth daily   clonazePAM (KlonoPIN) 0 25 MG disintegrating tablet   No No   Sig: Take 1 tablet (0 25 mg total) by mouth 2 (two) times a day as needed for anxiety   montelukast (SINGULAIR) 10 mg tablet   No No   Sig: TAKE 1 TABLET BY MOUTH DAILY AT BEDTIME   multivitamin (THERAGRAN) TABS   Yes No   Sig: Take 1 tablet by mouth daily      Facility-Administered Medications: None       Past Medical History:   Diagnosis Date    Herniated lumbar intervertebral disc        Past Surgical History:   Procedure Laterality Date    ENDOSCOPIC HEMILAMINOTOMY W/ DISCECTOMY LUMBAR      ROTATOR CUFF REPAIR Right        Family History   Problem Relation Age of Onset    No Known Problems Mother     Bone cancer Father     Mental illness Neg Hx      I have reviewed and agree with the history as documented  E-Cigarette/Vaping    E-Cigarette Use Former User      E-Cigarette/Vaping Substances    Nicotine No     THC No     CBD No     Flavoring No     Other No     Unknown No      Social History     Tobacco Use    Smoking status: Former Smoker     Types: Cigarettes     Quit date: 2013     Years since quittin 4    Smokeless tobacco: Never Used   Vaping Use    Vaping Use: Former   Substance Use Topics    Alcohol use: Yes     Comment: occasionally 1-2 drinks a month     Drug use: Never       Review of Systems   Constitutional: Positive for appetite change and fatigue  Negative for chills and fever  HENT: Negative for sore throat  Eyes: Negative for photophobia and visual disturbance  Respiratory: Positive for cough  Negative for chest tightness, shortness of breath and wheezing  Cardiovascular: Negative for chest pain, palpitations and leg swelling  Gastrointestinal: Positive for nausea and vomiting  Negative for abdominal distention, abdominal pain, constipation and diarrhea  Genitourinary: Negative for difficulty urinating, dysuria, flank pain and hematuria  Musculoskeletal: Negative for joint swelling and myalgias  Neurological: Positive for syncope and light-headedness  Negative for weakness, numbness and headaches  Physical Exam  Physical Exam  Vitals and nursing note reviewed  Constitutional:       General: He is not in acute distress  Appearance: He is well-developed  He is not diaphoretic  Comments: Well-appearing, non-distressed    HENT:      Head: Normocephalic and atraumatic        Comments: Moist mucous membranes      Right Ear: External ear normal       Left Ear: External ear normal       Nose: Nose normal       Mouth/Throat:      Pharynx: No oropharyngeal exudate  Eyes:      Conjunctiva/sclera: Conjunctivae normal       Pupils: Pupils are equal, round, and reactive to light  Cardiovascular:      Rate and Rhythm: Normal rate and regular rhythm  Heart sounds: Normal heart sounds  No murmur heard  No friction rub  No gallop  Pulmonary:      Effort: Pulmonary effort is normal  No respiratory distress  Breath sounds: Normal breath sounds  No wheezing  Comments: No increased WOB, sat'ing 95 percent on room air indicating adequate oxygenation  Lungs CTAB without wheezes, rales, or rhonchi  Chest:      Chest wall: No tenderness  Abdominal:      General: Bowel sounds are normal  There is no distension  Palpations: Abdomen is soft  There is no mass  Tenderness: There is no abdominal tenderness  There is no guarding or rebound  Comments: Abdomen soft, non-tender, non-distended    Musculoskeletal:         General: No deformity  Comments: No lower extremity swelling, erythema, or calf tenderness    Skin:     General: Skin is warm and dry  Capillary Refill: Capillary refill takes less than 2 seconds  Comments: Extremities warm and well-perfused  Capillary refill less than 2 seconds    Neurological:      Mental Status: He is alert and oriented to person, place, and time        Comments: AAOx4   Psychiatric:         Behavior: Behavior normal          Vital Signs  ED Triage Vitals [01/02/22 1529]   Temperature Pulse Respirations Blood Pressure SpO2   98 9 °F (37 2 °C) 77 20 95/53 94 %      Temp Source Heart Rate Source Patient Position - Orthostatic VS BP Location FiO2 (%)   Tympanic Monitor Sitting Left arm --      Pain Score       No Pain           Vitals:    01/02/22 1529 01/02/22 1550 01/02/22 1722   BP: 95/53 102/61 124/70   Pulse: 77 94 75   Patient Position - Orthostatic VS: Sitting Lying Lying         Visual Acuity      ED Medications  Medications - No data to display    Diagnostic Studies  Results Reviewed Procedure Component Value Units Date/Time    HS Troponin 0hr (reflex protocol) [[id number]]  (Normal) Collected: 01/02/22 1615    Lab Status: Final result Specimen: Blood from Arm, Left Updated: 01/02/22 1644     hs TnI 0hr <2 ng/L     Comprehensive metabolic panel [[id number]] Collected: 01/02/22 1615    Lab Status: Final result Specimen: Blood from Arm, Left Updated: 01/02/22 1636     Sodium 139 mmol/L      Potassium 4 1 mmol/L      Chloride 102 mmol/L      CO2 27 mmol/L      ANION GAP 10 mmol/L      BUN 17 mg/dL      Creatinine 1 15 mg/dL      Glucose 117 mg/dL      Calcium 8 4 mg/dL      AST 17 U/L      ALT 30 U/L      Alkaline Phosphatase 79 U/L      Total Protein 6 9 g/dL      Albumin 3 7 g/dL      Total Bilirubin 0 31 mg/dL      eGFR 69 ml/min/1 73sq m     Narrative:      [place] guidelines for Chronic Kidney Disease (CKD):     Stage 1 with normal or high GFR (GFR > 90 mL/min/1 73 square meters)    Stage 2 Mild CKD (GFR = 60-89 mL/min/1 73 square meters)    Stage 3A Moderate CKD (GFR = 45-59 mL/min/1 73 square meters)    Stage 3B Moderate CKD (GFR = 30-44 mL/min/1 73 square meters)    Stage 4 Severe CKD (GFR = 15-29 mL/min/1 73 square meters)    Stage 5 End Stage CKD (GFR <15 mL/min/1 73 square meters)  Note: GFR calculation is accurate only with a steady state creatinine    CBC and differential [[id number]]  (Abnormal) Collected: 01/02/22 1615    Lab Status: Final result Specimen: Blood from Arm, Left Updated: 01/02/22 1622     WBC 10 50 Thousand/uL      RBC 4 45 Million/uL      Hemoglobin 13 3 g/dL      Hematocrit 41 4 %      MCV 93 fL      MCH 29 9 pg      MCHC 32 1 g/dL      RDW 13 2 %      MPV 9 5 fL      Platelets 799 Thousands/uL      nRBC 0 /100 WBCs      Neutrophils Relative 81 %      Immat GRANS % 0 %      Lymphocytes Relative 7 %      Monocytes Relative 11 %      Eosinophils Relative 1 %      Basophils Relative 0 %      Neutrophils Absolute 8 49 Thousands/µL Immature Grans Absolute 0 03 Thousand/uL      Lymphocytes Absolute 0 78 Thousands/µL      Monocytes Absolute 1 17 Thousand/µL      Eosinophils Absolute 0 09 Thousand/µL      Basophils Absolute 0 03 Thousands/µL     Narrative: This is an appended report  These results have been appended to a previously verified report  XR chest 1 view portable   Final Result by Juliann Gonzalez MD (01/02 1826)      No acute cardiopulmonary disease  Workstation performed: [id number]                    Procedures  Procedures         ED Course                                             MDM  Number of Diagnoses or Management Options  COVID-19  Lightheadedness  Syncope  Diagnosis management comments: 63 M with syncopal episode in setting of COVID, poor PO intake, with preceding symptoms suggestive of vasovagal episode  Somewhat soft blood pressures noted in triage however normotensive throughout time in ED, well appearing with no recurrence of symptoms  Tolerating PO  CBC, CMP, troponin, EKG, CXR all performed and evaluated, grossly unremarkable  Patient instructed to continue home isolation, good oral hydration, discharged with verbal and written return precautions  Disposition  Final diagnoses:   XUPIC-12   Lightheadedness   Syncope     Time reflects when diagnosis was documented in both MDM as applicable and the Disposition within this note     Time User Action Codes Description Comment    1/2/2022  4:50 PM Km Rusty Add [U07 1] COVID-19     1/2/2022  4:50 PM Hughie Rusty Add [R42] Lightheadedness     1/2/2022  4:50 PM Km Rusty Add [R55] Syncope       ED Disposition     ED Disposition Condition Date/Time Comment    Discharge Stable Sun Jan 2, 2022  4:50 PM Yadiel Jacobo [address] discharge to home/self care              Follow-up Information     Follow up With Specialties Details Why Contact Info Additional [address] Emergency Department Emergency Medicine  If symptoms worsen [address] [address]  [address] [address] Emergency Department, [place], [place], [address]  Katina Guerrero , DO Family Medicine, Wound Care   [address] [place] [address] Drive  [address] [place] [address]  [phone]             Discharge Medication List as of 1/2/2022  4:53 PM      CONTINUE these medications which have NOT CHANGED    Details   amlodipine-olmesartan (JAG) 5-20 MG Take 1 tablet by mouth daily, Starting Wed 8/11/2021, Normal      cetirizine (ZyrTEC) 10 mg tablet Take 1 tablet (10 mg total) by mouth daily, Starting Thu 7/8/2021, No Print      clonazePAM (KlonoPIN) 0 25 MG disintegrating tablet Take 1 tablet (0 25 mg total) by mouth 2 (two) times a day as needed for anxiety, Starting Wed 12/22/2021, Normal      montelukast (SINGULAIR) 10 mg tablet TAKE 1 TABLET BY MOUTH DAILY AT BEDTIME, Normal      multivitamin (THERAGRAN) TABS Take 1 tablet by mouth daily, Historical Med             No discharge procedures on file      PDMP Review     None          ED Provider  Electronically Signed by           Katie Iglesias MD  01/04/22 8617

## 2022-01-04 LAB
ATRIAL RATE: 76 BPM
P AXIS: 36 DEGREES
PR INTERVAL: 180 MS
QRS AXIS: 50 DEGREES
QRSD INTERVAL: 92 MS
QT INTERVAL: 346 MS
QTC INTERVAL: 389 MS
T WAVE AXIS: 38 DEGREES
VENTRICULAR RATE: 76 BPM

## 2022-01-04 PROCEDURE — 93010 ELECTROCARDIOGRAM REPORT: CPT | Performed by: INTERNAL MEDICINE

## 2022-01-10 ENCOUNTER — OFFICE VISIT (OUTPATIENT)
Dept: FAMILY MEDICINE CLINIC | Facility: CLINIC | Age: 62
End: 2022-01-10
Payer: COMMERCIAL

## 2022-01-10 VITALS
RESPIRATION RATE: 16 BRPM | HEIGHT: 70 IN | BODY MASS INDEX: 28.49 KG/M2 | WEIGHT: 199 LBS | DIASTOLIC BLOOD PRESSURE: 70 MMHG | HEART RATE: 88 BPM | TEMPERATURE: 97 F | SYSTOLIC BLOOD PRESSURE: 128 MMHG

## 2022-01-10 DIAGNOSIS — I10 BENIGN ESSENTIAL HYPERTENSION: Primary | ICD-10-CM

## 2022-01-10 DIAGNOSIS — Z86.16 HISTORY OF COVID-19: ICD-10-CM

## 2022-01-10 DIAGNOSIS — F41.0 PANIC ATTACK: ICD-10-CM

## 2022-01-10 DIAGNOSIS — J06.9 UPPER RESPIRATORY TRACT INFECTION, UNSPECIFIED TYPE: ICD-10-CM

## 2022-01-10 DIAGNOSIS — F41.9 ANXIETY: ICD-10-CM

## 2022-01-10 PROCEDURE — 3725F SCREEN DEPRESSION PERFORMED: CPT | Performed by: FAMILY MEDICINE

## 2022-01-10 PROCEDURE — 99214 OFFICE O/P EST MOD 30 MIN: CPT | Performed by: FAMILY MEDICINE

## 2022-01-10 RX ORDER — CLONAZEPAM 0.5 MG/1
0.5 TABLET, ORALLY DISINTEGRATING ORAL DAILY PRN
Qty: 30 TABLET | Refills: 0 | Status: SHIPPED | OUTPATIENT
Start: 2022-01-10 | End: 2022-01-31 | Stop reason: SDUPTHER

## 2022-01-10 RX ORDER — PREDNISONE 20 MG/1
TABLET ORAL
Qty: 32 TABLET | Refills: 0 | Status: SHIPPED | OUTPATIENT
Start: 2022-01-10 | End: 2022-01-31 | Stop reason: ALTCHOICE

## 2022-01-10 RX ORDER — AZITHROMYCIN 250 MG/1
TABLET, FILM COATED ORAL
Qty: 6 TABLET | Refills: 0 | Status: SHIPPED | OUTPATIENT
Start: 2022-01-10 | End: 2022-01-15

## 2022-01-10 NOTE — PROGRESS NOTES
Assessment/Plan:    1  Benign essential hypertension  Assessment & Plan:  stable      2  History of COVID-19  -     predniSONE 20 mg tablet; 4 tabs for three days, 3 tabs for three days, 2 tabs for three days, 1 tab for three days, 1/2 tab for 4 days    3  Anxiety  -     clonazePAM (KlonoPIN) 0 5 MG disintegrating tablet; Take 1 tablet (0 5 mg total) by mouth daily as needed for anxiety    4  Panic attack  -     clonazePAM (KlonoPIN) 0 5 MG disintegrating tablet; Take 1 tablet (0 5 mg total) by mouth daily as needed for anxiety    5  Upper respiratory tract infection, unspecified type  -     azithromycin (ZITHROMAX) 250 mg tablet; 2 tabs on day 1, 1 tab a day for 4 days after          There are no Patient Instructions on file for this visit  No follow-ups on file  Subjective:      Patient ID: Reji Foster is a 63 y o  male  Chief Complaint   Patient presents with    Follow-up     F/U Covid  States that he is having "panick attacks" [place]       Pt states today is his last day of covid crisse  States he was in the ed last Sunday he passed out in the car - He had low BP  PT still does not have a whiole lot of energy  States the congestion in his head is off the charts  The rt ear is very clogged  Pt states his panic attacks are triggered by hearing his heart pound  Pt also asking wjhat he can do for his minor panic attacks  States he had them in the past the doc he had in the past gave him klonipin  And he would take that as needed  States a 30 day supply would last him 90 days  Pt states he does not want to take meds every day    Used to get the anxiety once or twice a week did have it down to monthly then did not have it at all but it seems to be back    Earache   There is pain in the right ear  This is a new problem  The current episode started in the past 7 days  The problem occurs constantly  The problem has been gradually improving  There has been no fever   The pain is at a severity of 2/10  Pertinent negatives include no abdominal pain, coughing, diarrhea, ear discharge, headaches, hearing loss, neck pain, rash, rhinorrhea, sore throat or vomiting  The following portions of the patient's history were reviewed and updated as appropriate: allergies, current medications, past family history, past medical history, past social history, past surgical history and problem list     Review of Systems   HENT: Positive for ear pain  Negative for ear discharge, hearing loss, rhinorrhea and sore throat  Respiratory: Negative for cough  Gastrointestinal: Negative for abdominal pain, diarrhea and vomiting  Musculoskeletal: Negative for neck pain  Skin: Negative for rash  Neurological: Negative for headaches  Current Outpatient Medications   Medication Sig Dispense Refill    amlodipine-olmesartan (JAG) 5-20 MG Take 1 tablet by mouth daily 90 tablet 1    cetirizine (ZyrTEC) 10 mg tablet Take 1 tablet (10 mg total) by mouth daily      clonazePAM (KlonoPIN) 0 5 MG disintegrating tablet Take 1 tablet (0 5 mg total) by mouth daily as needed for anxiety 30 tablet 0    montelukast (SINGULAIR) 10 mg tablet TAKE 1 TABLET BY MOUTH DAILY AT BEDTIME 90 tablet 1    azithromycin (ZITHROMAX) 250 mg tablet 2 tabs on day 1, 1 tab a day for 4 days after 6 tablet 0    multivitamin (THERAGRAN) TABS Take 1 tablet by mouth daily (Patient not taking: Reported on 1/10/2022 )      predniSONE 20 mg tablet 4 tabs for three days, 3 tabs for three days, 2 tabs for three days, 1 tab for three days, 1/2 tab for 4 days 32 tablet 0     No current facility-administered medications for this visit  Objective:    /70   Pulse 88   Temp (!) 97 °F (36 1 °C)   Resp 16   Ht 5' 9 75" (1 772 m)   Wt 90 3 kg (199 lb)   BMI 28 76 kg/m²        Physical Exam  Vitals and nursing note reviewed  Constitutional:       General: He is not in acute distress  Appearance: He is well-developed   He is not diaphoretic  HENT:      Head: Normocephalic and atraumatic  Right Ear: External ear normal       Left Ear: External ear normal       Nose: Congestion and rhinorrhea present  Mouth/Throat:      Pharynx: No oropharyngeal exudate  Eyes:      General: No scleral icterus  Right eye: No discharge  Left eye: No discharge  Pupils: Pupils are equal, round, and reactive to light  Neck:      Thyroid: No thyromegaly  Cardiovascular:      Rate and Rhythm: Normal rate  Heart sounds: Normal heart sounds  No murmur heard  Pulmonary:      Effort: Pulmonary effort is normal  No respiratory distress  Breath sounds: Normal breath sounds  No wheezing  Abdominal:      General: Bowel sounds are normal  There is no distension  Palpations: Abdomen is soft  There is no mass  Tenderness: There is no abdominal tenderness  There is no guarding or rebound  Musculoskeletal:         General: Normal range of motion  Skin:     General: Skin is warm and dry  Findings: No erythema or rash  Neurological:      Mental Status: He is alert  Coordination: Coordination normal       Deep Tendon Reflexes: Reflexes normal    Psychiatric:         Mood and Affect: Mood is anxious           Behavior: Behavior normal                 Dianelys Andino DO

## 2022-01-31 ENCOUNTER — OFFICE VISIT (OUTPATIENT)
Dept: FAMILY MEDICINE CLINIC | Facility: CLINIC | Age: 62
End: 2022-01-31
Payer: COMMERCIAL

## 2022-01-31 VITALS
HEART RATE: 70 BPM | BODY MASS INDEX: 29.55 KG/M2 | RESPIRATION RATE: 18 BRPM | WEIGHT: 206.4 LBS | SYSTOLIC BLOOD PRESSURE: 130 MMHG | HEIGHT: 70 IN | OXYGEN SATURATION: 98 % | TEMPERATURE: 98.7 F | DIASTOLIC BLOOD PRESSURE: 82 MMHG

## 2022-01-31 DIAGNOSIS — Z13.6 SCREENING FOR CARDIOVASCULAR CONDITION: ICD-10-CM

## 2022-01-31 DIAGNOSIS — F41.9 ANXIETY: Primary | ICD-10-CM

## 2022-01-31 DIAGNOSIS — F41.0 PANIC ATTACK: ICD-10-CM

## 2022-01-31 DIAGNOSIS — I10 BENIGN ESSENTIAL HYPERTENSION: ICD-10-CM

## 2022-01-31 PROCEDURE — 3008F BODY MASS INDEX DOCD: CPT | Performed by: FAMILY MEDICINE

## 2022-01-31 PROCEDURE — 3079F DIAST BP 80-89 MM HG: CPT | Performed by: FAMILY MEDICINE

## 2022-01-31 PROCEDURE — 1036F TOBACCO NON-USER: CPT | Performed by: FAMILY MEDICINE

## 2022-01-31 PROCEDURE — 99213 OFFICE O/P EST LOW 20 MIN: CPT | Performed by: FAMILY MEDICINE

## 2022-01-31 PROCEDURE — 3075F SYST BP GE 130 - 139MM HG: CPT | Performed by: FAMILY MEDICINE

## 2022-01-31 RX ORDER — CLONAZEPAM 0.5 MG/1
0.5 TABLET, ORALLY DISINTEGRATING ORAL DAILY PRN
Qty: 30 TABLET | Refills: 1 | Status: SHIPPED | OUTPATIENT
Start: 2022-01-31 | End: 2022-03-08 | Stop reason: SDUPTHER

## 2022-01-31 RX ORDER — AMLODIPINE AND OLMESARTAN MEDOXOMIL 5; 20 MG/1; MG/1
1 TABLET ORAL DAILY
Qty: 90 TABLET | Refills: 1 | Status: SHIPPED | OUTPATIENT
Start: 2022-01-31 | End: 2022-08-01 | Stop reason: SDUPTHER

## 2022-01-31 NOTE — PROGRESS NOTES
Assessment/Plan:    1  Anxiety  Assessment & Plan:  Not controlled  Will start sertraline      [place] monitoring [place] reviewed and is appropriate  Orders:  -     sertraline (Zoloft) 50 mg tablet; Take 1 tablet (50 mg total) by mouth daily  -     clonazePAM (KlonoPIN) 0 5 MG disintegrating tablet; Take 1 tablet (0 5 mg total) by mouth daily as needed for anxiety    2  Screening for cardiovascular condition  -     VAS screening; Future; Expected date: 01/31/2022    3  Benign essential hypertension  Assessment & Plan:  Stable  Continue Jag 5-20 mg a day    Orders:  -     amlodipine-olmesartan (JAG) 5-20 MG; Take 1 tablet by mouth daily    4  Panic attack  -     clonazePAM (KlonoPIN) 0 5 MG disintegrating tablet; Take 1 tablet (0 5 mg total) by mouth daily as needed for anxiety         There are no Patient Instructions on file for this visit  Return in about 6 weeks (around 3/14/2022) for Next scheduled follow up, cancel 2/11/22 appointment   Subjective:      Patient ID: Fela Cruz is a 63 y o  male  Chief Complaint   Patient presents with    Shortness of Breath     had covid 4 weeks ago nm  lpn    Fatigue    Anxiety     gets anxiety and high HR from SOB        He had Covid about a month ago  He is still not feeling back to normal     He is still having shortness of breath  He can hear his heart beat sometimes  It is triggering his anxiety to go up  He has been meditating with no relief            The following portions of the patient's history were reviewed and updated as appropriate:  past social history    Review of Systems      Current Outpatient Medications   Medication Sig Dispense Refill    amlodipine-olmesartan (JAG) 5-20 MG Take 1 tablet by mouth daily 90 tablet 1    clonazePAM (KlonoPIN) 0 5 MG disintegrating tablet Take 1 tablet (0 5 mg total) by mouth daily as needed for anxiety 30 tablet 1    multivitamin (THERAGRAN) TABS Take 1 tablet by mouth daily  sertraline (Zoloft) 50 mg tablet Take 1 tablet (50 mg total) by mouth daily 90 tablet 1     No current facility-administered medications for this visit  Objective:    /82   Pulse 70   Temp 98 7 °F (37 1 °C)   Resp 18   Ht 5' 9 75" (1 772 m)   Wt 93 6 kg (206 lb 6 4 oz)   SpO2 98%   BMI 29 83 kg/m²      Physical Exam  Vitals and nursing note reviewed  Constitutional:       Appearance: He is well-developed  HENT:      Head: Normocephalic and atraumatic  Right Ear: Tympanic membrane and external ear normal       Left Ear: Tympanic membrane and external ear normal    Cardiovascular:      Rate and Rhythm: Normal rate and regular rhythm  Heart sounds: Normal heart sounds  No murmur heard  Pulmonary:      Effort: Pulmonary effort is normal  No respiratory distress  Breath sounds: Normal breath sounds  No wheezing or rales  Musculoskeletal:      Right lower leg: No edema  Left lower leg: No edema               Kavita You, DO

## 2022-01-31 NOTE — ASSESSMENT & PLAN NOTE
Not controlled  Will start sertraline      [place] reviewed and is appropriate

## 2022-03-07 ENCOUNTER — HOSPITAL ENCOUNTER (OUTPATIENT)
Dept: RADIOLOGY | Facility: HOSPITAL | Age: 62
Discharge: HOME | End: 2022-03-07
Payer: COMMERCIAL

## 2022-03-07 DIAGNOSIS — Z13.6 SCREENING FOR CARDIOVASCULAR CONDITION: ICD-10-CM

## 2022-03-07 PROCEDURE — 93922 UPR/L XTREMITY ART 2 LEVELS: CPT | Performed by: SURGERY

## 2022-03-07 PROCEDURE — 93880 EXTRACRANIAL BILAT STUDY: CPT | Performed by: SURGERY

## 2022-03-07 PROCEDURE — 93922 UPR/L XTREMITY ART 2 LEVELS: CPT

## 2022-03-07 PROCEDURE — 93978 VASCULAR STUDY: CPT | Performed by: SURGERY

## 2022-03-08 DIAGNOSIS — F41.0 PANIC ATTACK: ICD-10-CM

## 2022-03-08 DIAGNOSIS — F41.9 ANXIETY: ICD-10-CM

## 2022-03-08 RX ORDER — CLONAZEPAM 0.5 MG/1
0.5 TABLET, ORALLY DISINTEGRATING ORAL DAILY PRN
Qty: 30 TABLET | Refills: 1 | Status: SHIPPED | OUTPATIENT
Start: 2022-03-08 | End: 2022-05-03 | Stop reason: SDUPTHER

## 2022-03-14 ENCOUNTER — OFFICE VISIT (OUTPATIENT)
Dept: FAMILY MEDICINE CLINIC | Facility: CLINIC | Age: 62
End: 2022-03-14
Payer: COMMERCIAL

## 2022-03-14 VITALS
DIASTOLIC BLOOD PRESSURE: 80 MMHG | BODY MASS INDEX: 29.92 KG/M2 | RESPIRATION RATE: 18 BRPM | OXYGEN SATURATION: 98 % | SYSTOLIC BLOOD PRESSURE: 124 MMHG | HEART RATE: 74 BPM | WEIGHT: 209 LBS | HEIGHT: 70 IN | TEMPERATURE: 97.6 F

## 2022-03-14 DIAGNOSIS — Z12.11 SCREENING FOR COLORECTAL CANCER: ICD-10-CM

## 2022-03-14 DIAGNOSIS — I10 BENIGN ESSENTIAL HYPERTENSION: ICD-10-CM

## 2022-03-14 DIAGNOSIS — F41.9 ANXIETY: ICD-10-CM

## 2022-03-14 DIAGNOSIS — Z11.59 NEED FOR HEPATITIS C SCREENING TEST: ICD-10-CM

## 2022-03-14 DIAGNOSIS — Z12.12 SCREENING FOR COLORECTAL CANCER: ICD-10-CM

## 2022-03-14 DIAGNOSIS — R00.2 PALPITATIONS: ICD-10-CM

## 2022-03-14 DIAGNOSIS — I65.23 ATHEROSCLEROSIS OF BOTH CAROTID ARTERIES: Primary | ICD-10-CM

## 2022-03-14 PROCEDURE — 3008F BODY MASS INDEX DOCD: CPT | Performed by: FAMILY MEDICINE

## 2022-03-14 PROCEDURE — 1036F TOBACCO NON-USER: CPT | Performed by: FAMILY MEDICINE

## 2022-03-14 PROCEDURE — 3074F SYST BP LT 130 MM HG: CPT | Performed by: FAMILY MEDICINE

## 2022-03-14 PROCEDURE — 99214 OFFICE O/P EST MOD 30 MIN: CPT | Performed by: FAMILY MEDICINE

## 2022-03-14 PROCEDURE — 3079F DIAST BP 80-89 MM HG: CPT | Performed by: FAMILY MEDICINE

## 2022-03-14 NOTE — ASSESSMENT & PLAN NOTE
Reviewed recent vascular screening test  Will repeat evaluation in 1 year  Recommended that he get a lipid panel done

## 2022-03-14 NOTE — PROGRESS NOTES
Assessment/Plan:    1  Atherosclerosis of both carotid arteries  Assessment & Plan:  Reviewed recent vascular screening test  Will repeat evaluation in 1 year  Recommended that he get a lipid panel done      Orders:  -     VAS carotid complete study; Future; Expected date: 03/14/2023    2  Screening for colorectal cancer  -     Ambulatory referral for Colonoscopy; Future    3  Anxiety  Assessment & Plan:  Sertraline did not help   Will continue clonazepam prn       4  Benign essential hypertension  Assessment & Plan:  Stable  Continue Robyn 5-20 mg a day    Orders:  -     CBC and differential; Future  -     Comprehensive metabolic panel; Future  -     Lipid Panel with Direct LDL reflex; Future  -     CBC and differential  -     Comprehensive metabolic panel  -     Lipid Panel with Direct LDL reflex    5  Palpitations  Comments:  new  Orders:  -     Ambulatory referral to Cardiology; Future    6  Need for hepatitis C screening test  -     Hepatitis C antibody; Future  -     Hepatitis C antibody    BMI Counseling: Body mass index is 30 2 kg/m²  The BMI is above normal  Exercise recommendations include exercising 3-5 times per week  Rationale for BMI follow-up plan is due to patient being overweight or obese  There are no Patient Instructions on file for this visit  Return in about 3 months (around 6/14/2022) for Next scheduled follow up  Subjective:      Patient ID: Severa Chol is a 63 y o  male  Chief Complaint   Patient presents with    Follow-up     medications  rmklpn       He is feeling well today  He has been getting intermittent palpitations  The sertraline has not helped him  The following portions of the patient's history were reviewed and updated as appropriate: allergies, current medications, past family history, past medical history, past social history, past surgical history and problem list     Review of Systems   Constitutional: Negative  Respiratory: Negative  Cardiovascular: Positive for palpitations  Current Outpatient Medications   Medication Sig Dispense Refill    amlodipine-olmesartan (JAG) 5-20 MG Take 1 tablet by mouth daily 90 tablet 1    clonazePAM (KlonoPIN) 0 5 MG disintegrating tablet Take 1 tablet (0 5 mg total) by mouth daily as needed for anxiety 30 tablet 1    multivitamin (THERAGRAN) TABS Take 1 tablet by mouth daily         No current facility-administered medications for this visit  Objective:    /80   Pulse 74   Temp 97 6 °F (36 4 °C)   Resp 18   Ht 5' 9 75" (1 772 m)   Wt 94 8 kg (209 lb)   SpO2 98%   BMI 30 20 kg/m²        Physical Exam  Vitals and nursing note reviewed  Constitutional:       Appearance: He is well-developed  HENT:      Head: Normocephalic and atraumatic  Right Ear: Tympanic membrane and external ear normal       Left Ear: Tympanic membrane and external ear normal    Cardiovascular:      Rate and Rhythm: Normal rate and regular rhythm  Heart sounds: Normal heart sounds  No murmur heard  Pulmonary:      Effort: Pulmonary effort is normal  No respiratory distress  Breath sounds: Normal breath sounds  No wheezing or rales  Musculoskeletal:      Right lower leg: No edema  Left lower leg: No edema                  Fannie Roly, DO

## 2022-04-26 ENCOUNTER — CONSULT (OUTPATIENT)
Dept: CARDIOLOGY CLINIC | Facility: CLINIC | Age: 62
End: 2022-04-26
Payer: COMMERCIAL

## 2022-04-26 VITALS
HEIGHT: 70 IN | HEART RATE: 74 BPM | WEIGHT: 213 LBS | SYSTOLIC BLOOD PRESSURE: 120 MMHG | OXYGEN SATURATION: 94 % | BODY MASS INDEX: 30.49 KG/M2 | DIASTOLIC BLOOD PRESSURE: 82 MMHG | TEMPERATURE: 97.9 F

## 2022-04-26 DIAGNOSIS — I10 BENIGN ESSENTIAL HYPERTENSION: ICD-10-CM

## 2022-04-26 DIAGNOSIS — R00.2 PALPITATIONS: Primary | ICD-10-CM

## 2022-04-26 DIAGNOSIS — I65.23 ATHEROSCLEROSIS OF BOTH CAROTID ARTERIES: ICD-10-CM

## 2022-04-26 PROCEDURE — 3079F DIAST BP 80-89 MM HG: CPT | Performed by: INTERNAL MEDICINE

## 2022-04-26 PROCEDURE — 99204 OFFICE O/P NEW MOD 45 MIN: CPT | Performed by: INTERNAL MEDICINE

## 2022-04-26 PROCEDURE — 93000 ELECTROCARDIOGRAM COMPLETE: CPT | Performed by: INTERNAL MEDICINE

## 2022-04-26 PROCEDURE — 3008F BODY MASS INDEX DOCD: CPT | Performed by: INTERNAL MEDICINE

## 2022-04-26 PROCEDURE — 3074F SYST BP LT 130 MM HG: CPT | Performed by: INTERNAL MEDICINE

## 2022-04-26 PROCEDURE — 1036F TOBACCO NON-USER: CPT | Performed by: INTERNAL MEDICINE

## 2022-04-26 RX ORDER — PROPRANOLOL HYDROCHLORIDE 20 MG/1
20 TABLET ORAL 3 TIMES DAILY PRN
COMMUNITY
Start: 2022-03-22 | End: 2022-05-23 | Stop reason: SDUPTHER

## 2022-04-26 NOTE — PROGRESS NOTES
[facility]  [address]   [place], [address]    Cardiology Consultation    Chrisite Rizzo  [id number]  1960      Consult for: Palpitations  Appreciate consult by: Rohan Ledesma DO      Discussion/Summary:     1  Palpitations  - Likely benign cause of palpitations  ECG was reviewed  Rhythm was sinus with normal intervals and axis  He has no features in history which would suggest atrial fibrillation, SVT or other significant arrhythmia as the cause of her palpitations  He has no history of cardiac disease, structural heart disease or family history of SCD/early CAD  Electrolytes were normal on last blood work  - He has not had a previous echocardiogram, one will be ordered to rule out any structural heart disease  - 48 hour holter monitor will also be obtained if palpitations return  May continue propanolol bid     - Discussed monitoring for any triggers of palpitations such as stress, coffee, alcohol or lack of sleep  - He should call if any change in nature of palpitations, syncope or near syncope develop  2  Benign essential hypertension  - BP controlled on current medication regimen    3  Atherosclerosis of both carotid arteries  -Discussed risk factor reduction including refraining from smoking, eating a diet high in fruits and vegetables, maintaining a healthy weight, limiting screen time along with controlling BP and cholesterol  Encouraged to exercise 150 minutes a week at a moderate level such as a fast walk or 75 minutes of high intensity  HPI:     Christie Rizzo is a 63 y o  here for evaluation of palpitations  Patient has noticed palpitations for the past 5 months  He feels it began after receiving his COVID-19 vaccination  He describes the palpitations as a racing heartbeat the last for 1-2 minutes  Associated with the palpitations is anxiety but no syncope, shortness of breath, lightheadedness or dizziness  Palpitations resolved spontaneously  Previously, they were occurring every other day  He had propranolol that was prescribed to him in the past for anxiety and started using propanolol 20 mg twice a day  Since starting the propanolol, palpitations have essentially resolved  He has no significant side effects from propanolol  He did develop COVID in January during which she had symptoms of shortness of breath/fatigue and sinus congestion  He denies any chest pain or shortness of breath with exertion  He is a former smoker and underwent vascular screening  He had mild disease noted in bilateral carotid arteries  Lipid panel was ordered but he has not had it done yet  Past Medical History:   Diagnosis Date    Herniated lumbar intervertebral disc      Social History     Tobacco Use    Smoking status: Former Smoker     Types: Cigarettes     Quit date: 2013     Years since quittin 8    Smokeless tobacco: Never Used   Vaping Use    Vaping Use: Former   Substance Use Topics    Alcohol use: Yes     Comment: occasionally 1-2 drinks a month     Drug use: Never      Family History   Problem Relation Age of Onset    No Known Problems Mother     Bone cancer Father     Mental illness Neg Hx      Past Surgical History:   Procedure Laterality Date    ENDOSCOPIC HEMILAMINOTOMY W/ DISCECTOMY LUMBAR      ROTATOR CUFF REPAIR Right        Current Outpatient Medications:     amlodipine-olmesartan (JAG) 5-20 MG, Take 1 tablet by mouth daily, Disp: 90 tablet, Rfl: 1    clonazePAM (KlonoPIN) 0 5 MG disintegrating tablet, Take 1 tablet (0 5 mg total) by mouth daily as needed for anxiety, Disp: 30 tablet, Rfl: 1    multivitamin (THERAGRAN) TABS, Take 1 tablet by mouth daily  , Disp: , Rfl:     propranolol (INDERAL) 20 mg tablet, Take 20 mg by mouth 3 (three) times a day as needed, Disp: , Rfl:   No Known Allergies    Review of Systems:   Review of Systems   Respiratory: Negative for shortness of breath  Cardiovascular: Positive for palpitations  Negative for chest pain and leg swelling  Neurological: Negative for syncope  All other systems reviewed and are negative  Physical Examination:     Vitals:    04/26/22 0756   BP: 120/82   BP Location: Right arm   Patient Position: Sitting   Cuff Size: Large   Pulse: 74   Temp: 97 9 °F (36 6 °C)   SpO2: 94%   Weight: 96 6 kg (213 lb)   Height: 5' 9 75" (1 772 m)       Physical Exam   Constitutional: He appears healthy  No distress  Eyes: Pupils are equal, round, and reactive to light  Conjunctivae are normal    Neck: No JVD present  Cardiovascular: Normal rate, regular rhythm and normal heart sounds  Exam reveals no gallop and no friction rub  No murmur heard  Pulmonary/Chest: Effort normal and breath sounds normal  He has no wheezes  He has no rales  Musculoskeletal:         General: No tenderness, deformity or edema  Cervical back: Normal range of motion and neck supple  Neurological: He is alert and oriented to person, place, and time  Skin: Skin is warm and dry          Labs:     Lab Results   Component Value Date    WBC 10 50 (H) 01/02/2022    HGB 13 3 01/02/2022    HCT 41 4 01/02/2022    MCV 93 01/02/2022    RDW 13 2 01/02/2022     01/02/2022     BMP:  Lab Results   Component Value Date    SODIUM 139 01/02/2022    K 4 1 01/02/2022     01/02/2022    CO2 27 01/02/2022    BUN 17 01/02/2022    CREATININE 1 15 01/02/2022    GLUC 117 01/02/2022    CALCIUM 8 4 01/02/2022    EGFR 69 01/02/2022     LFT:  Lab Results   Component Value Date    AST 17 01/02/2022    ALT 30 01/02/2022    ALKPHOS 79 01/02/2022    TP 6 9 01/02/2022    ALB 3 7 01/02/2022      No results found for: [id number]  No results found for: HGBA1C  Lipid Profile:   No results found for: CHOLESTEROL, HDL, LDLCALC, TRIG  No results found for: CHOLESTEROL  No results found for: CKTOTAL, CKMB, CKMBINDEX, TROPONINI  No results found for: NTBNP   No results found for this or any previous visit (from the past 672 hour(s))  Imaging & Testing   I have personally reviewed pertinent reports  Cardiac Testing   No results found for this or any previous visit  EKG: Personally reviewed  Normal sinus rhythm with normal intervals and normal axis      Debra Burgos DO, [place]  [phone]  Please call with any questions

## 2022-05-03 DIAGNOSIS — F41.0 PANIC ATTACK: ICD-10-CM

## 2022-05-03 DIAGNOSIS — F41.9 ANXIETY: ICD-10-CM

## 2022-05-03 RX ORDER — CLONAZEPAM 0.5 MG/1
0.5 TABLET, ORALLY DISINTEGRATING ORAL DAILY PRN
Qty: 30 TABLET | Refills: 3 | Status: SHIPPED | OUTPATIENT
Start: 2022-05-03 | End: 2022-08-01 | Stop reason: SDUPTHER

## 2022-05-03 NOTE — TELEPHONE ENCOUNTER
----- Message from Tony Prieto sent at 5/3/2022  1:16 PM EDT -----  Regarding: Refill   Hi Dr Lm Brown all is well  Can you please send in a refill for clonazapam? I have a few left but Im going out of town for about a week       Thank you,  Tadeo Simons

## 2022-05-12 ENCOUNTER — HOSPITAL ENCOUNTER (OUTPATIENT)
Dept: NON INVASIVE DIAGNOSTICS | Facility: HOSPITAL | Age: 62
Discharge: HOME | End: 2022-05-12
Attending: INTERNAL MEDICINE
Payer: COMMERCIAL

## 2022-05-12 VITALS
HEIGHT: 70 IN | HEART RATE: 75 BPM | WEIGHT: 213 LBS | DIASTOLIC BLOOD PRESSURE: 90 MMHG | BODY MASS INDEX: 30.49 KG/M2 | SYSTOLIC BLOOD PRESSURE: 127 MMHG

## 2022-05-12 DIAGNOSIS — R00.2 PALPITATIONS: ICD-10-CM

## 2022-05-12 LAB
AORTIC ROOT: 3.8 CM
APICAL FOUR CHAMBER EJECTION FRACTION: 49 %
AV LVOT PEAK GRADIENT: 4 MMHG
AV PEAK GRADIENT: 6 MMHG
DOP CALC LVOT AREA: 3.14 CM2
DOP CALC LVOT DIAMETER: 2 CM
E WAVE DECELERATION TIME: 224 MS
FRACTIONAL SHORTENING: 31 % (ref 28–44)
INTERVENTRICULAR SEPTUM IN DIASTOLE (PARASTERNAL SHORT AXIS VIEW): 0.8 CM
INTERVENTRICULAR SEPTUM: 0.8 CM (ref 0.6–1.1)
LAAS-AP2: 16.2 CM2
LAAS-AP4: 15.4 CM2
LEFT ATRIUM AREA SYSTOLE SINGLE PLANE A4C: 17 CM2
LEFT ATRIUM SIZE: 3.3 CM
LEFT INTERNAL DIMENSION IN SYSTOLE: 3.3 CM (ref 2.1–4)
LEFT VENTRICLE DIASTOLIC VOLUME (MOD BIPLANE): 103 ML
LEFT VENTRICLE SYSTOLIC VOLUME (MOD BIPLANE): 49 ML
LEFT VENTRICULAR INTERNAL DIMENSION IN DIASTOLE: 4.8 CM (ref 3.5–6)
LEFT VENTRICULAR POSTERIOR WALL IN END DIASTOLE: 0.9 CM
LEFT VENTRICULAR STROKE VOLUME: 62 ML
LV EF: 52 %
LVSV (TEICH): 62 ML
MV E'TISSUE VEL-LAT: 10 CM/S
MV E'TISSUE VEL-SEP: 9 CM/S
MV PEAK A VEL: 0.73 M/S
MV PEAK E VEL: 66 CM/S
MV STENOSIS PRESSURE HALF TIME: 65 MS
MV VALVE AREA P 1/2 METHOD: 3.38 CM2
PV PEAK GRADIENT: 3 MMHG
RIGHT ATRIUM AREA SYSTOLE A4C: 13.7 CM2
RIGHT VENTRICLE ID DIMENSION: 2.8 CM
SL CV LEFT ATRIUM LENGTH A2C: 4.7 CM
SL CV LV EF: 55
SL CV PED ECHO LEFT VENTRICLE DIASTOLIC VOLUME (MOD BIPLANE) 2D: 106 ML
SL CV PED ECHO LEFT VENTRICLE SYSTOLIC VOLUME (MOD BIPLANE) 2D: 44 ML
TR MAX PG: 10 MMHG
TR PEAK VELOCITY: 1.5 M/S
TRICUSPID VALVE PEAK REGURGITATION VELOCITY: 1.54 M/S

## 2022-05-12 PROCEDURE — 93306 TTE W/DOPPLER COMPLETE: CPT | Performed by: INTERNAL MEDICINE

## 2022-05-12 PROCEDURE — 93306 TTE W/DOPPLER COMPLETE: CPT

## 2022-05-23 DIAGNOSIS — F41.9 ANXIETY: Primary | ICD-10-CM

## 2022-05-23 RX ORDER — PROPRANOLOL HYDROCHLORIDE 20 MG/1
20 TABLET ORAL 3 TIMES DAILY PRN
Qty: 90 TABLET | Refills: 1 | Status: SHIPPED | OUTPATIENT
Start: 2022-05-23 | End: 2022-08-01 | Stop reason: SDUPTHER

## 2022-05-23 NOTE — TELEPHONE ENCOUNTER
----- Message from Lisa Sol sent at 5/23/2022 11:11 AM EDT -----  Regarding: Propanol refill   Hi Dr Perez [facility], [place],     Can you please send in a prescription  refill for the propanol? It was prescribed by my last Dr Teressa Nissen havent filled it yet  [facility] on [facility]  Its propanol hcl 20  Directions on the bottle are 1-3 times daily as needed   Dispensed was 90 tablets in the past      Thank you,   Kirsten Leach

## 2022-07-14 ENCOUNTER — TELEPHONE (OUTPATIENT)
Dept: FAMILY MEDICINE CLINIC | Facility: CLINIC | Age: 62
End: 2022-07-14

## 2022-07-14 DIAGNOSIS — E87.5 HYPERKALEMIA: Primary | ICD-10-CM

## 2022-07-14 LAB
ALBUMIN SERPL-MCNC: 4.4 G/DL (ref 3.8–4.9)
ALBUMIN/GLOB SERPL: 1.9 {RATIO} (ref 1.2–2.2)
ALP SERPL-CCNC: 67 IU/L (ref 44–121)
ALT SERPL-CCNC: 15 IU/L (ref 0–44)
AST SERPL-CCNC: 21 IU/L (ref 0–40)
BASOPHILS # BLD AUTO: 0.1 X10E3/UL (ref 0–0.2)
BASOPHILS NFR BLD AUTO: 1 %
BILIRUB SERPL-MCNC: 0.3 MG/DL (ref 0–1.2)
BUN SERPL-MCNC: 25 MG/DL (ref 8–27)
BUN/CREAT SERPL: 24 (ref 10–24)
CALCIUM SERPL-MCNC: 9.7 MG/DL (ref 8.6–10.2)
CHLORIDE SERPL-SCNC: 99 MMOL/L (ref 96–106)
CHOLEST SERPL-MCNC: 266 MG/DL (ref 100–199)
CO2 SERPL-SCNC: 23 MMOL/L (ref 20–29)
CREAT SERPL-MCNC: 1.05 MG/DL (ref 0.76–1.27)
EGFR: 81 ML/MIN/1.73
EOSINOPHIL # BLD AUTO: 0.2 X10E3/UL (ref 0–0.4)
EOSINOPHIL NFR BLD AUTO: 3 %
ERYTHROCYTE [DISTWIDTH] IN BLOOD BY AUTOMATED COUNT: 12.4 % (ref 11.6–15.4)
GLOBULIN SER-MCNC: 2.3 G/DL (ref 1.5–4.5)
GLUCOSE SERPL-MCNC: 101 MG/DL (ref 65–99)
HCT VFR BLD AUTO: 39.4 % (ref 37.5–51)
HCV AB S/CO SERPL IA: <0.1 S/CO RATIO (ref 0–0.9)
HDLC SERPL-MCNC: 49 MG/DL
HGB BLD-MCNC: 13.9 G/DL (ref 13–17.7)
IMM GRANULOCYTES # BLD: 0 X10E3/UL (ref 0–0.1)
IMM GRANULOCYTES NFR BLD: 0 %
LDLC SERPL CALC-MCNC: 189 MG/DL (ref 0–99)
LDLC/HDLC SERPL: 3.9 RATIO (ref 0–3.6)
LYMPHOCYTES # BLD AUTO: 1.5 X10E3/UL (ref 0.7–3.1)
LYMPHOCYTES NFR BLD AUTO: 27 %
MCH RBC QN AUTO: 31 PG (ref 26.6–33)
MCHC RBC AUTO-ENTMCNC: 35.3 G/DL (ref 31.5–35.7)
MCV RBC AUTO: 88 FL (ref 79–97)
MICRODELETION SYND BLD/T FISH: NORMAL
MONOCYTES # BLD AUTO: 0.8 X10E3/UL (ref 0.1–0.9)
MONOCYTES NFR BLD AUTO: 13 %
NEUTROPHILS # BLD AUTO: 3.2 X10E3/UL (ref 1.4–7)
NEUTROPHILS NFR BLD AUTO: 56 %
PLATELET # BLD AUTO: 258 X10E3/UL (ref 150–450)
POTASSIUM SERPL-SCNC: 5.3 MMOL/L (ref 3.5–5.2)
PROT SERPL-MCNC: 6.7 G/DL (ref 6–8.5)
RBC # BLD AUTO: 4.48 X10E6/UL (ref 4.14–5.8)
SL AMB VLDL CHOLESTEROL CALC: 28 MG/DL (ref 5–40)
SODIUM SERPL-SCNC: 137 MMOL/L (ref 134–144)
TRIGL SERPL-MCNC: 152 MG/DL (ref 0–149)
WBC # BLD AUTO: 5.7 X10E3/UL (ref 3.4–10.8)

## 2022-07-14 NOTE — TELEPHONE ENCOUNTER
7/14/2022 12:03 PM spoke with Gertrude Solis he had the labs done right after going away on vacation  He does not think his cholesterol is really that high  He did agree to get his potassium rechecked in the next 1-2 weeks  suspect lab error    Message complete  Rose De La Fuente, DO none

## 2022-07-14 NOTE — TELEPHONE ENCOUNTER
7/14/2022 11:45 AM Called Rayshawn Ellis regarding his lab results  His potassium is up to 5 3 and needs to be rechecked  Cholesterol is not controlled at 266  Fasting sugar is down to 101  Kidney function, liver enzymes and blood count are normal     Left message on his voicemail to call the office     Ally Lerner DO

## 2022-07-28 ENCOUNTER — VBI (OUTPATIENT)
Dept: ADMINISTRATIVE | Facility: OTHER | Age: 62
End: 2022-07-28

## 2022-08-01 DIAGNOSIS — F41.9 ANXIETY: ICD-10-CM

## 2022-08-01 DIAGNOSIS — F41.0 PANIC ATTACK: ICD-10-CM

## 2022-08-01 DIAGNOSIS — I10 BENIGN ESSENTIAL HYPERTENSION: ICD-10-CM

## 2022-08-02 RX ORDER — CLONAZEPAM 0.5 MG/1
0.5 TABLET, ORALLY DISINTEGRATING ORAL DAILY PRN
Qty: 30 TABLET | Refills: 1 | Status: SHIPPED | OUTPATIENT
Start: 2022-08-02 | End: 2022-10-24 | Stop reason: SDUPTHER

## 2022-08-02 RX ORDER — AMLODIPINE AND OLMESARTAN MEDOXOMIL 5; 20 MG/1; MG/1
1 TABLET ORAL DAILY
Qty: 90 TABLET | Refills: 1 | Status: SHIPPED | OUTPATIENT
Start: 2022-08-02 | End: 2022-09-16 | Stop reason: SINTOL

## 2022-08-02 RX ORDER — PROPRANOLOL HYDROCHLORIDE 20 MG/1
20 TABLET ORAL 3 TIMES DAILY PRN
Qty: 90 TABLET | Refills: 1 | Status: SHIPPED | OUTPATIENT
Start: 2022-08-02 | End: 2022-10-28

## 2022-08-29 ENCOUNTER — OFFICE VISIT (OUTPATIENT)
Dept: FAMILY MEDICINE CLINIC | Facility: CLINIC | Age: 62
End: 2022-08-29
Payer: COMMERCIAL

## 2022-08-29 VITALS
OXYGEN SATURATION: 98 % | HEIGHT: 70 IN | TEMPERATURE: 97.8 F | WEIGHT: 207 LBS | RESPIRATION RATE: 16 BRPM | HEART RATE: 84 BPM | DIASTOLIC BLOOD PRESSURE: 80 MMHG | SYSTOLIC BLOOD PRESSURE: 116 MMHG | BODY MASS INDEX: 29.63 KG/M2

## 2022-08-29 DIAGNOSIS — Z00.00 ANNUAL PHYSICAL EXAM: Primary | ICD-10-CM

## 2022-08-29 DIAGNOSIS — Z12.11 COLON CANCER SCREENING: ICD-10-CM

## 2022-08-29 DIAGNOSIS — I10 BENIGN ESSENTIAL HYPERTENSION: ICD-10-CM

## 2022-08-29 DIAGNOSIS — Z12.5 PROSTATE CANCER SCREENING: ICD-10-CM

## 2022-08-29 DIAGNOSIS — E87.5 HYPERKALEMIA: ICD-10-CM

## 2022-08-29 DIAGNOSIS — E78.2 MIXED HYPERLIPIDEMIA: ICD-10-CM

## 2022-08-29 PROCEDURE — 3074F SYST BP LT 130 MM HG: CPT | Performed by: FAMILY MEDICINE

## 2022-08-29 PROCEDURE — 3725F SCREEN DEPRESSION PERFORMED: CPT | Performed by: FAMILY MEDICINE

## 2022-08-29 PROCEDURE — 99396 PREV VISIT EST AGE 40-64: CPT | Performed by: FAMILY MEDICINE

## 2022-08-29 PROCEDURE — 3079F DIAST BP 80-89 MM HG: CPT | Performed by: FAMILY MEDICINE

## 2022-08-29 NOTE — PATIENT INSTRUCTIONS

## 2022-08-29 NOTE — PROGRESS NOTES
[address]    NAME: Lela Verde  AGE: 63 y o  SEX: male  : 1960     DATE: 2022     Assessment and Plan:     Problem List Items Addressed This Visit     Benign essential hypertension     Well controlled   If he continues to lose weight and maintain his exercise program goal be to stop the amlodipine portion of his current medication         Relevant Orders    CBC    Comprehensive metabolic panel    Lipid Panel with Direct LDL reflex    Mixed hyperlipidemia     Not controlled  ASCVD risk score is 12 9  He declines medication at this time due to risk of side effects  Will work on diet and recheck in 6 months  Relevant Orders    Lipid Panel with Direct LDL reflex      Other Visit Diagnoses     Annual physical exam    -  Primary    Hyperkalemia        Relevant Orders    Basic metabolic panel    Colon cancer screening        Relevant Orders    Ambulatory referral for colonoscopy    Prostate cancer screening        Relevant Orders    PSA Total (Reflex To Free)          Immunizations and preventive care screenings were discussed with patient today  Appropriate education was printed on patient's after visit summary  Counseling:  Dental Health: discussed importance of regular tooth brushing, flossing, and dental visits  Exercise: the importance of regular exercise/physical activity was discussed  Recommend exercise 3-5 times per week for at least 30 minutes  Shingrix vaccine declined  Depression Screening and Follow-up Plan: Patient was screened for depression during today's encounter  They screened negative with a PHQ-2 score of 0  Return in about 6 months (around 2023) for Blood pressure check       Chief Complaint:     Chief Complaint   Patient presents with    Physical Exam     Mz cma      History of Present Illness:     Adult Annual Physical   Patient here for a comprehensive physical exam  The patient reports he eating more fruits and vegetabls and would like to avoid medication  Diet and Physical Activity  Diet/Nutrition: well balanced diet  Exercise: moderate cardiovascular exercise  Depression Screening  PHQ-2/9 Depression Screening    Little interest or pleasure in doing things: 0 - not at all  Feeling down, depressed, or hopeless: 0 - not at all  PHQ-2 Score: 0  PHQ-2 Interpretation: Negative depression screen       General Health  Sleep: sleeps well  Hearing: significantly decreased - bilateral   Vision: goes for regular eye exams and wears glasses  Dental: no dental visits for >1 year   Health  Symptoms include: none     Review of Systems:     Review of Systems   Constitutional: Negative  Respiratory: Negative  Cardiovascular: Negative for chest pain        Past Medical History:     Past Medical History:   Diagnosis Date    Herniated lumbar intervertebral disc       Past Surgical History:     Past Surgical History:   Procedure Laterality Date    ENDOSCOPIC HEMILAMINOTOMY W/ DISCECTOMY LUMBAR      ROTATOR CUFF REPAIR Right       Family History:     Family History   Problem Relation Age of Onset    No Known Problems Mother     Bone cancer Father     Mental illness Neg Hx       Social History:     Social History     Socioeconomic History    Marital status: /Civil Union     Spouse name: None    Number of children: None    Years of education: None    Highest education level: None   Occupational History    None   Tobacco Use    Smoking status: Former Smoker     Types: Cigarettes     Quit date: 2013     Years since quittin 1    Smokeless tobacco: Never Used   Vaping Use    Vaping Use: Never used   Substance and Sexual Activity    Alcohol use: Yes     Comment: occasionally 1-2 drinks a month     Drug use: Never    Sexual activity: None   Other Topics Concern    None   Social History Narrative    None     Social Determinants of Health Financial Resource Strain: Not on file   Food Insecurity: Not on file   Transportation Needs: Not on file   Physical Activity: Not on file   Stress: Not on file   Social Connections: Not on file   Intimate Partner Violence: Not on file   Housing Stability: Not on file      Current Medications:     Current Outpatient Medications   Medication Sig Dispense Refill    amlodipine-olmesartan (JAG) 5-20 MG Take 1 tablet by mouth daily 90 tablet 1    clonazePAM (KlonoPIN) 0 5 MG disintegrating tablet Take 1 tablet (0 5 mg total) by mouth daily as needed for anxiety 30 tablet 1    multivitamin (THERAGRAN) TABS Take 1 tablet by mouth daily        propranolol (INDERAL) 20 mg tablet Take 1 tablet (20 mg total) by mouth 3 (three) times a day as needed (anxiety) 90 tablet 1     No current facility-administered medications for this visit  Allergies:     No Known Allergies   Physical Exam:     /80   Pulse 84   Temp 97 8 °F (36 6 °C)   Resp 16   Ht 5' 10" (1 778 m)   Wt 93 9 kg (207 lb)   SpO2 98%   BMI 29 70 kg/m²     Physical Exam  Vitals reviewed  Constitutional:       Appearance: He is well-developed  HENT:      Head: Normocephalic and atraumatic  Right Ear: External ear normal       Left Ear: External ear normal    Cardiovascular:      Rate and Rhythm: Normal rate and regular rhythm  Heart sounds: Normal heart sounds  No murmur heard  Pulmonary:      Effort: Pulmonary effort is normal  No respiratory distress  Breath sounds: Normal breath sounds  No wheezing  Abdominal:      Palpations: Abdomen is soft  Tenderness: There is no abdominal tenderness  Musculoskeletal:         General: Normal range of motion  Skin:     General: Skin is warm and dry  Neurological:      Mental Status: He is alert and oriented to person, place, and time            No exam data present      Jorge Chase, [address]

## 2022-08-29 NOTE — ASSESSMENT & PLAN NOTE
Well controlled   If he continues to lose weight and maintain his exercise program goal be to stop the amlodipine portion of his current medication

## 2022-08-29 NOTE — ASSESSMENT & PLAN NOTE
Not controlled  ASCVD risk score is 12 9  He declines medication at this time due to risk of side effects  Will work on diet and recheck in 6 months

## 2022-09-15 LAB
BUN SERPL-MCNC: 24 MG/DL (ref 8–27)
BUN/CREAT SERPL: 24 (ref 10–24)
CALCIUM SERPL-MCNC: 10 MG/DL (ref 8.6–10.2)
CHLORIDE SERPL-SCNC: 101 MMOL/L (ref 96–106)
CO2 SERPL-SCNC: 25 MMOL/L (ref 20–29)
CREAT SERPL-MCNC: 1 MG/DL (ref 0.76–1.27)
EGFR: 86 ML/MIN/1.73
GLUCOSE SERPL-MCNC: 104 MG/DL (ref 65–99)
POTASSIUM SERPL-SCNC: 5.7 MMOL/L (ref 3.5–5.2)
SODIUM SERPL-SCNC: 138 MMOL/L (ref 134–144)

## 2022-09-16 ENCOUNTER — TELEPHONE (OUTPATIENT)
Dept: FAMILY MEDICINE CLINIC | Facility: CLINIC | Age: 62
End: 2022-09-16

## 2022-09-16 DIAGNOSIS — I10 BENIGN ESSENTIAL HYPERTENSION: Primary | ICD-10-CM

## 2022-09-16 DIAGNOSIS — E87.5 HYPERKALEMIA: ICD-10-CM

## 2022-09-16 RX ORDER — AMLODIPINE BESYLATE 10 MG/1
10 TABLET ORAL DAILY
Qty: 90 TABLET | Refills: 1 | Status: SHIPPED | OUTPATIENT
Start: 2022-09-16

## 2022-09-16 NOTE — TELEPHONE ENCOUNTER
9/16/2022 12:28 PM Called Donte Lima regarding his elevated potassium  It is now to 5 7  I am concerned it may be from his olmesartan  Olmesartan discontinue but will continue amlodipine at a higher dose of 10 mg daily  Will have him repeat his BMP in 1 month    New order sent right to [facility]     Mr

## 2022-09-29 ENCOUNTER — HOSPITAL ENCOUNTER (OUTPATIENT)
Dept: RADIOLOGY | Facility: HOSPITAL | Age: 62
Discharge: HOME | End: 2022-09-29
Payer: COMMERCIAL
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
## 2022-09-29 PROCEDURE — 76705 ECHO EXAM OF ABDOMEN: CPT

## 2022-10-24 DIAGNOSIS — F41.0 PANIC ATTACK: ICD-10-CM

## 2022-10-24 DIAGNOSIS — F41.9 ANXIETY: ICD-10-CM

## 2022-10-24 RX ORDER — CLONAZEPAM 0.5 MG/1
0.5 TABLET, ORALLY DISINTEGRATING ORAL DAILY PRN
Qty: 30 TABLET | Refills: 3 | Status: SHIPPED | OUTPATIENT
Start: 2022-10-24

## 2022-10-28 DIAGNOSIS — F41.9 ANXIETY: ICD-10-CM

## 2022-10-28 RX ORDER — PROPRANOLOL HYDROCHLORIDE 20 MG/1
TABLET ORAL
Qty: 90 TABLET | Refills: 1 | Status: SHIPPED | OUTPATIENT
Start: 2022-10-28

## 2022-11-29 ENCOUNTER — OFFICE VISIT (OUTPATIENT)
Dept: FAMILY MEDICINE CLINIC | Facility: CLINIC | Age: 62
End: 2022-11-29

## 2022-11-29 VITALS
OXYGEN SATURATION: 98 % | WEIGHT: 204 LBS | HEART RATE: 62 BPM | SYSTOLIC BLOOD PRESSURE: 132 MMHG | RESPIRATION RATE: 18 BRPM | TEMPERATURE: 97.6 F | BODY MASS INDEX: 29.2 KG/M2 | DIASTOLIC BLOOD PRESSURE: 84 MMHG | HEIGHT: 70 IN

## 2022-11-29 DIAGNOSIS — I10 BENIGN ESSENTIAL HYPERTENSION: Primary | ICD-10-CM

## 2022-11-29 DIAGNOSIS — Z12.5 PROSTATE CANCER SCREENING: ICD-10-CM

## 2022-11-29 NOTE — ASSESSMENT & PLAN NOTE
Much improved with diet and exercise  Has not been taking amlodipine because of side effects  Amlodipine discontinued

## 2022-11-29 NOTE — PROGRESS NOTES
Name: Feliz Jones      : 1960      MRN: [id number]  Encounter Provider: Cherie Kim DO  Encounter Date: 2022   Encounter department: [address] [place] [address]     1  Benign essential hypertension  Assessment & Plan:  Much improved with diet and exercise  Has not been taking amlodipine because of side effects  Amlodipine discontinued  Orders:  -     CBC; Future; Expected date: 2023  -     Comprehensive metabolic panel; Future; Expected date: 2023  -     Lipid Panel with Direct LDL reflex; Future; Expected date: 2023  -     CBC  -     Comprehensive metabolic panel  -     Lipid Panel with Direct LDL reflex    2  Prostate cancer screening  -     PSA Total (Reflex To Free); Future; Expected date: 2023  -     PSA Total (Reflex To Free)      BMI Counseling: Body mass index is 29 27 kg/m²  The BMI is above normal  Exercise recommendations include exercising 3-5 times per week  Rationale for BMI follow-up plan is due to patient being overweight or obese  Depression Screening and Follow-up Plan: Patient was screened for depression during today's encounter  They screened negative with a PHQ-2 score of 0  Return in about 6 months (around 2023) for Blood pressure check and needs nursing visit for 1st Shingrix   Declined flu vaccine     Subjective      Patient reports they continues to watch his diet very carefully  He is staying very active  He has been having issues with the amlodipine  He has gradually been taking a lower dose because he kept having different side effects from it  He feels much better off of the medication      Review of Systems    Current Outpatient Medications on File Prior to Visit   Medication Sig   • clonazePAM (KlonoPIN) 0 5 MG disintegrating tablet Take 1 tablet (0 5 mg total) by mouth daily as needed for anxiety   • multivitamin (THERAGRAN) TABS Take 1 tablet by mouth daily     • propranolol (INDERAL) 20 mg tablet TAKE ONE TABLET BY MOUTH 3 TIMES A DAY   • [DISCONTINUED] amLODIPine (NORVASC) 10 mg tablet Take 1 tablet (10 mg total) by mouth daily (Patient not taking: Reported on 11/29/2022)       Objective     /84   Pulse 62   Temp 97 6 °F (36 4 °C)   Resp 18   Ht 5' 10" (1 778 m)   Wt 92 5 kg (204 lb)   SpO2 98%   BMI 29 27 kg/m²     Physical Exam  Vitals and nursing note reviewed  Constitutional:       Appearance: He is well-developed  HENT:      Head: Normocephalic and atraumatic  Right Ear: Tympanic membrane and external ear normal       Left Ear: Tympanic membrane and external ear normal    Cardiovascular:      Rate and Rhythm: Normal rate and regular rhythm  Heart sounds: Normal heart sounds  No murmur heard  Pulmonary:      Effort: Pulmonary effort is normal  No respiratory distress  Breath sounds: Normal breath sounds  No wheezing or rales  Musculoskeletal:      Right lower leg: No edema  Left lower leg: No edema         Pablito Cobos DO

## 2022-12-16 DIAGNOSIS — F41.9 ANXIETY: ICD-10-CM

## 2022-12-16 RX ORDER — PROPRANOLOL HYDROCHLORIDE 20 MG/1
TABLET ORAL
Qty: 90 TABLET | Refills: 1 | Status: SHIPPED | OUTPATIENT
Start: 2022-12-16

## 2023-01-06 ENCOUNTER — OFFICE VISIT (OUTPATIENT)
Dept: FAMILY MEDICINE CLINIC | Facility: CLINIC | Age: 63
End: 2023-01-06

## 2023-01-06 VITALS
WEIGHT: 209 LBS | HEART RATE: 64 BPM | BODY MASS INDEX: 29.92 KG/M2 | HEIGHT: 70 IN | SYSTOLIC BLOOD PRESSURE: 136 MMHG | TEMPERATURE: 97.7 F | DIASTOLIC BLOOD PRESSURE: 88 MMHG | RESPIRATION RATE: 18 BRPM

## 2023-01-06 DIAGNOSIS — Z23 NEED FOR VACCINATION: ICD-10-CM

## 2023-01-06 DIAGNOSIS — Z12.2 ENCOUNTER FOR SCREENING FOR LUNG CANCER: ICD-10-CM

## 2023-01-06 DIAGNOSIS — Z87.891 PERSONAL HISTORY OF SMOKING: ICD-10-CM

## 2023-01-06 DIAGNOSIS — K92.1 BLOOD IN STOOL: ICD-10-CM

## 2023-01-06 DIAGNOSIS — K64.4 EXTERNAL HEMORRHOID: Primary | ICD-10-CM

## 2023-01-06 NOTE — PROGRESS NOTES
Name: Arlene Crowe      : 1960      MRN: [id number]  Encounter Provider: Fannie Dunham DO  Encounter Date: 2023   Encounter department: [facility]     1  External hemorrhoid  Comments:  will use preparation H    2  Encounter for screening for lung cancer  -     CT lung screening program; Future; Expected date: 2023    3  Personal history of smoking  -     CT lung screening program; Future; Expected date: 2023    4  Blood in stool  -     Ambulatory referral to Gastroenterology; Future    5  Need for vaccination  -     Zoster Vaccine Recombinant IM      Lung Cancer Screening Shared Decision Making: I discussed with him that he is a candidate for lung cancer CT screening  The following Shared Decision-Making points were covered:  1  Benefits of screening were discussed, including the rates of reduction in death from lung cancer and other causes  Harms of screening were reviewed, including false positive tests, radiation exposure levels, risks of invasive procedures, risks of complications of screening, and risk of overdiagnosis  2  I counseled on the importance of adherence to annual lung cancer LDCT screening, impact of co-morbidities, and ability or willingness to undergo diagnosis and treatment  3  I counseled on the importance of maintaining abstinence as a former smoker or was counseled on the importance of smoking cessation if a current smoker    Review of Eligibility Criteria: He meets all of the criteria for Lung Cancer Screening    - He is 63 y o    - He has 20 pack year tobacco history and is a current smoker or has quit within the past 15 years  - He presents no signs or symptoms of lung cancer    After discussion, the patient decided to elect lung cancer screening  Return if symptoms worsen or fail to improve, for needs 2nd Shingrix nursing visit in 2 months   Subjective      He noticed some blood in his stool on 23    He has a history of hemorrhoids that he could feel when he wipes  He doesn't feel a hemorrhoid now when he wipes  He was sitting on a concrete floor for a couple of hours the day before  The bleeding is improving, but is still there  The blood is bright red blood  He didn't have any pain with his stool, but was large stools the week before  Review of Systems    Current Outpatient Medications on File Prior to Visit   Medication Sig   • clonazePAM (KlonoPIN) 0 5 MG disintegrating tablet Take 1 tablet (0 5 mg total) by mouth daily as needed for anxiety   • multivitamin (THERAGRAN) TABS Take 1 tablet by mouth daily     • [DISCONTINUED] propranolol (INDERAL) 20 mg tablet TAKE ONE TABLET BY MOUTH 3 TIMES A DAY (Patient not taking: Reported on 1/6/2023)       Objective     /88   Pulse 64   Temp 97 7 °F (36 5 °C)   Resp 18   Ht 5' 10" (1 778 m)   Wt 94 8 kg (209 lb)   BMI 29 99 kg/m²     Physical Exam  Vitals and nursing note reviewed  Constitutional:       Appearance: He is well-developed  HENT:      Head: Normocephalic and atraumatic  Right Ear: Tympanic membrane and external ear normal       Left Ear: Tympanic membrane and external ear normal    Cardiovascular:      Rate and Rhythm: Normal rate and regular rhythm  Heart sounds: Normal heart sounds  No murmur heard  Pulmonary:      Effort: Pulmonary effort is normal  No respiratory distress  Breath sounds: Normal breath sounds  No wheezing or rales  Genitourinary:      Musculoskeletal:      Right lower leg: No edema  Left lower leg: No edema         Brian Farrell, DO

## 2023-01-18 ENCOUNTER — CONSULT (OUTPATIENT)
Dept: GASTROENTEROLOGY | Facility: CLINIC | Age: 63
End: 2023-01-18

## 2023-01-18 VITALS
WEIGHT: 206 LBS | BODY MASS INDEX: 29.49 KG/M2 | DIASTOLIC BLOOD PRESSURE: 97 MMHG | SYSTOLIC BLOOD PRESSURE: 150 MMHG | HEIGHT: 70 IN

## 2023-01-18 DIAGNOSIS — Z12.11 COLON CANCER SCREENING: Primary | ICD-10-CM

## 2023-01-18 DIAGNOSIS — K92.1 BLOOD IN STOOL: ICD-10-CM

## 2023-01-18 NOTE — PATIENT INSTRUCTIONS
Miralax 238 G and Ducolax bowel prep     Scheduled date of colonoscopy (as of today): 3/14/23  Physician performing colonoscopy: Dr Alf Lynch  Location of colonoscopy: [facility]  Bowel prep reviewed with patient:  Miralax/Dulcolax  Instructions reviewed with patient by:  Clearances:   N/A   Pt is vaccinated

## 2023-01-18 NOTE — PROGRESS NOTES
[facility] - Outpatient Consultation  Pastor San 63 y o  male MRN: [id number]  Encounter: [id number]          ASSESSMENT AND PLAN:      1  Blood in stool  Patient reported blood in stool and blood when he wipes which started on 1/1/2023  He does have a history of hemorrhoids  PCP did rectal exam which showed hemorrhoid not thrombosed  - Ambulatory referral to Gastroenterology  - Colonoscopy;schedule for colonoscopy  Prep and procedure explained to patient in detail  Further recommendations pending results for colonoscopy  -MiraLAX and Dulcolax bowel prep    2  Colon cancer screening  Patient is age 63 and never had colonoscopy for colon cancer screening   - Colonoscopy; schedule for colonoscopy  Prep and procedure explained to patient in detail  Further recommendations pending results for colonoscopy  -MiraLAX and Dulcolax bowel prep    Follow-up 4 weeks after procedure    ______________________________________________________________________    HPI:  Madeline Olivarez is a 63-year-old male who presents to office for consult for colon cancer screening and report of blood in stool and blood from rectal area when he wipes  Patient reported blood in stool and blood when he wipes which started on 1/1/2023  He does have a history of hemorrhoids  PCP did rectal exam which showed hemorrhoid not thrombosed  Patient denies black tarry stool or rectal discomfort  Patient denies nausea, vomiting, acid reflux, heartburn, epigastric or abdominal pain  Bowel patterns are regular  Abdomen exam benign, no abdominal tenderness or guarding  Patient is a former smoker  No family history of gastric or colon cancer  No previous EGD or colonoscopy  Patient is on no blood thinners or antiplatelet medication  Patient does not overuse NSAIDs  REVIEW OF SYSTEMS:    CONSTITUTIONAL: Denies any fever, chills, rigors, and weight loss  HEENT: No earache or tinnitus   Denies hearing loss or visual disturbances  CARDIOVASCULAR: No chest pain or palpitations  RESPIRATORY: Denies any cough, hemoptysis, shortness of breath or dyspnea on exertion  GASTROINTESTINAL: As noted in the History of Present Illness  GENITOURINARY: No problems with urination  Denies any hematuria or dysuria  NEUROLOGIC: No dizziness or vertigo, denies headaches  MUSCULOSKELETAL: Denies any muscle or joint pain  SKIN: Denies skin rashes or itching  ENDOCRINE: Denies excessive thirst  Denies intolerance to heat or cold  PSYCHOSOCIAL: Denies depression or anxiety  Denies any recent memory loss  Historical Information   Past Medical History:   Diagnosis Date   • Herniated lumbar intervertebral disc      Past Surgical History:   Procedure Laterality Date   • ENDOSCOPIC HEMILAMINOTOMY W/ DISCECTOMY LUMBAR     • ROTATOR CUFF REPAIR Right      Social History   Social History     Substance and Sexual Activity   Alcohol Use Yes    Comment: occasionally 1-2 drinks a month      Social History     Substance and Sexual Activity   Drug Use Never     Social History     Tobacco Use   Smoking Status Former   • Packs/day:    • Years:    • Pack years:    • Types: Cigarettes   • Start date: 20   • Quit date: 2013   • Years since quittin 5   Smokeless Tobacco Never     Family History   Problem Relation Age of Onset   • No Known Problems Mother    • Bone cancer Father    • Mental illness Neg Hx        Meds/Allergies       Current Outpatient Medications:   •  clonazePAM (KlonoPIN) 0 5 MG disintegrating tablet  •  multivitamin (THERAGRAN) TABS  •  psyllium (METAMUCIL) 58 6 % packet    No Known Allergies        Objective     Blood pressure 150/97, height 5' 10" (1 778 m), weight 93 4 kg (206 lb)  Body mass index is 29 56 kg/m²          PHYSICAL EXAM:      General Appearance:   Alert, cooperative, no distress   HEENT:   Normocephalic, atraumatic, anicteric      Neck:  Supple, symmetrical, trachea midline   Lungs:   Clear to auscultation bilaterally; no rales, rhonchi or wheezing; respirations unlabored    Heart[de-identified]   Regular rate and rhythm; no murmur, rub, or gallop  Abdomen:   Soft, non-tender, non-distended; normal bowel sounds; no masses, no organomegaly    Genitalia:   Deferred    Rectal:   Deferred    Extremities:  No cyanosis, clubbing or edema    Pulses:  2+ and symmetric    Skin:  No jaundice, rashes, or lesions    Lymph nodes:  No palpable cervical lymphadenopathy        Lab Results:   No visits with results within 1 Day(s) from this visit  Latest known visit with results is:   Office Visit on 08/29/2022   Component Date Value   • Glucose, Random 09/15/2022 104 (H)    • BUN 09/15/2022 24    • Creatinine 09/15/2022 1 00    • eGFR 09/15/2022 86    • SL AMB BUN/CREATININE RA* 09/15/2022 24    • Sodium 09/15/2022 138    • Potassium 09/15/2022 5 7 (H)    • Chloride 09/15/2022 101    • CO2 09/15/2022 25    • CALCIUM 09/15/2022 10 0          Radiology Results:   No results found

## 2023-02-06 DIAGNOSIS — F41.9 ANXIETY: ICD-10-CM

## 2023-02-06 RX ORDER — PROPRANOLOL HYDROCHLORIDE 20 MG/1
TABLET ORAL
Qty: 90 TABLET | Refills: 1 | Status: SHIPPED | OUTPATIENT
Start: 2023-02-06

## 2023-02-27 ENCOUNTER — TELEPHONE (OUTPATIENT)
Dept: GASTROENTEROLOGY | Facility: CLINIC | Age: 63
End: 2023-02-27

## 2023-02-27 NOTE — TELEPHONE ENCOUNTER
Spoke with pt reminding him of his colonoscopy 3/14 with Dr Caleb Means at [facility]  They will call him day before with arrival time, he has his prep/instructions and he is aware he needs a

## 2023-03-07 DIAGNOSIS — F41.9 ANXIETY: ICD-10-CM

## 2023-03-07 DIAGNOSIS — F41.0 PANIC ATTACK: ICD-10-CM

## 2023-03-07 RX ORDER — CLONAZEPAM 0.5 MG/1
0.5 TABLET, ORALLY DISINTEGRATING ORAL DAILY PRN
Qty: 30 TABLET | Refills: 0 | Status: SHIPPED | OUTPATIENT
Start: 2023-03-07

## 2023-03-07 RX ORDER — PROPRANOLOL HYDROCHLORIDE 20 MG/1
TABLET ORAL
Qty: 90 TABLET | Refills: 1 | Status: SHIPPED | OUTPATIENT
Start: 2023-03-07 | End: 2023-03-14 | Stop reason: ALTCHOICE

## 2023-03-09 ENCOUNTER — TELEPHONE (OUTPATIENT)
Dept: FAMILY MEDICINE CLINIC | Facility: CLINIC | Age: 63
End: 2023-03-09

## 2023-03-13 ENCOUNTER — ANESTHESIA EVENT (OUTPATIENT)
Dept: ANESTHESIOLOGY | Facility: HOSPITAL | Age: 63
End: 2023-03-13

## 2023-03-13 ENCOUNTER — ANESTHESIA (OUTPATIENT)
Dept: ANESTHESIOLOGY | Facility: HOSPITAL | Age: 63
End: 2023-03-13

## 2023-03-13 RX ORDER — SODIUM CHLORIDE, SODIUM LACTATE, POTASSIUM CHLORIDE, CALCIUM CHLORIDE 600; 310; 30; 20 MG/100ML; MG/100ML; MG/100ML; MG/100ML
75 INJECTION, SOLUTION INTRAVENOUS CONTINUOUS
Status: CANCELLED | OUTPATIENT
Start: 2023-03-13

## 2023-03-13 NOTE — ANESTHESIA PREPROCEDURE EVALUATION
Procedure:  PRE-OP ONLY    Relevant Problems   CARDIO   (+) Atherosclerosis of both carotid arteries   (+) Benign essential hypertension   (+) Mixed hyperlipidemia      NEURO/PSYCH   (+) Anxiety             Anesthesia Plan  ASA Score- 2     Anesthesia Type- IV sedation with anesthesia with ASA Monitors  Additional Monitors:   Airway Plan:           Plan Factors-    Chart reviewed  EKG reviewed  Imaging results reviewed  Existing labs reviewed  Patient summary reviewed  Patient is not a current smoker  Patient did not smoke on day of surgery  Induction- intravenous  Postoperative Plan-     Informed Consent- Anesthetic plan and risks discussed with patient  I personally reviewed this patient with the CRNA  Discussed and agreed on the Anesthesia Plan with the CRNA  Ashutosh Field             Plan:  IV sedation/MAC, GA as backup

## 2023-03-14 ENCOUNTER — ANESTHESIA EVENT (OUTPATIENT)
Dept: GASTROENTEROLOGY | Facility: AMBULARY SURGERY CENTER | Age: 63
End: 2023-03-14

## 2023-03-14 ENCOUNTER — HOSPITAL ENCOUNTER (OUTPATIENT)
Dept: GASTROENTEROLOGY | Facility: AMBULARY SURGERY CENTER | Age: 63
Setting detail: OUTPATIENT SURGERY
Discharge: HOME | End: 2023-03-14
Attending: INTERNAL MEDICINE

## 2023-03-14 ENCOUNTER — ANESTHESIA (OUTPATIENT)
Dept: GASTROENTEROLOGY | Facility: AMBULARY SURGERY CENTER | Age: 63
End: 2023-03-14

## 2023-03-14 VITALS
HEART RATE: 68 BPM | RESPIRATION RATE: 18 BRPM | TEMPERATURE: 98.1 F | OXYGEN SATURATION: 98 % | SYSTOLIC BLOOD PRESSURE: 130 MMHG | DIASTOLIC BLOOD PRESSURE: 81 MMHG

## 2023-03-14 DIAGNOSIS — K62.89 PROCTITIS: Primary | ICD-10-CM

## 2023-03-14 DIAGNOSIS — K92.1 BLOOD IN STOOL: ICD-10-CM

## 2023-03-14 DIAGNOSIS — Z12.11 COLON CANCER SCREENING: ICD-10-CM

## 2023-03-14 RX ORDER — MESALAMINE 1000 MG/1
1000 SUPPOSITORY RECTAL
Qty: 14 SUPPOSITORY | Refills: 1 | Status: SHIPPED | OUTPATIENT
Start: 2023-03-14

## 2023-03-14 RX ORDER — LIDOCAINE HYDROCHLORIDE 10 MG/ML
INJECTION, SOLUTION EPIDURAL; INFILTRATION; INTRACAUDAL; PERINEURAL AS NEEDED
Status: DISCONTINUED | OUTPATIENT
Start: 2023-03-14 | End: 2023-03-14

## 2023-03-14 RX ORDER — PROPOFOL 10 MG/ML
INJECTION, EMULSION INTRAVENOUS CONTINUOUS PRN
Status: DISCONTINUED | OUTPATIENT
Start: 2023-03-14 | End: 2023-03-14

## 2023-03-14 RX ORDER — PROPOFOL 10 MG/ML
INJECTION, EMULSION INTRAVENOUS AS NEEDED
Status: DISCONTINUED | OUTPATIENT
Start: 2023-03-14 | End: 2023-03-14

## 2023-03-14 RX ORDER — SODIUM CHLORIDE, SODIUM LACTATE, POTASSIUM CHLORIDE, CALCIUM CHLORIDE 600; 310; 30; 20 MG/100ML; MG/100ML; MG/100ML; MG/100ML
75 INJECTION, SOLUTION INTRAVENOUS CONTINUOUS
Status: DISCONTINUED | OUTPATIENT
Start: 2023-03-14 | End: 2023-03-18 | Stop reason: HOSPADM

## 2023-03-14 RX ADMIN — PROPOFOL 140 MCG/KG/MIN: 10 INJECTION, EMULSION INTRAVENOUS at 07:37

## 2023-03-14 RX ADMIN — PROPOFOL 100 MG: 10 INJECTION, EMULSION INTRAVENOUS at 07:37

## 2023-03-14 RX ADMIN — LIDOCAINE HYDROCHLORIDE 50 MG: 10 INJECTION, SOLUTION EPIDURAL; INFILTRATION; INTRACAUDAL; PERINEURAL at 07:37

## 2023-03-14 RX ADMIN — SODIUM CHLORIDE, SODIUM LACTATE, POTASSIUM CHLORIDE, AND CALCIUM CHLORIDE 75 ML/HR: .6; .31; .03; .02 INJECTION, SOLUTION INTRAVENOUS at 07:12

## 2023-03-14 NOTE — ANESTHESIA PREPROCEDURE EVALUATION
Procedure:  COLONOSCOPY    Relevant Problems   ANESTHESIA (within normal limits)   (-) History of anesthesia complications      CARDIO   (+) Atherosclerosis of both carotid arteries   (+) Benign essential hypertension   (+) Mixed hyperlipidemia      ENDO (within normal limits)      GI/HEPATIC (within normal limits)      /RENAL (within normal limits)      HEMATOLOGY (within normal limits)      MUSCULOSKELETAL (within normal limits)      NEURO/PSYCH   (+) Anxiety      PULMONARY (within normal limits)        Physical Exam    Airway    Mallampati score: III  TM Distance: >3 FB  Neck ROM: full     Dental       Cardiovascular  Rhythm: regular, Rate: normal, Cardiovascular exam normal    Pulmonary  Pulmonary exam normal Breath sounds clear to auscultation,     Other Findings        Anesthesia Plan  ASA Score- 2     Anesthesia Type- IV sedation with anesthesia with ASA Monitors  Additional Monitors:   Airway Plan:           Plan Factors-Exercise tolerance (METS): >4 METS  Chart reviewed  EKG reviewed  Imaging results reviewed  Existing labs reviewed  Patient summary reviewed  Patient is not a current smoker  Patient did not smoke on day of surgery  Induction- intravenous  Postoperative Plan-     Informed Consent- Anesthetic plan and risks discussed with patient  I personally reviewed this patient with the CRNA  Discussed and agreed on the Anesthesia Plan with the CRNA           NPO verified  NKDA  Plan:  IV sedation/MAC, GA as backup    Benefits and risks of sedation were discussed with the patient including possibility of recall under sedation and the potential for conversion to general anesthesia if necessary  All questions were answered  Anesthesia consent was obtained from the patient

## 2023-03-14 NOTE — ANESTHESIA POSTPROCEDURE EVALUATION
Post-Op Assessment Note    CV Status:  Stable  Pain Score: 0    Pain management: adequate     Mental Status:  Alert and awake   Hydration Status:  Euvolemic   PONV Controlled:  Controlled   Airway Patency:  Patent      Post Op Vitals Reviewed: Yes      Staff: Anesthesiologist, CRNA         No notable events documented      BP   118/67   Temp      Pulse  67   Resp   14   SpO2   99

## 2023-03-14 NOTE — H&P
History and Physical - [facility]  Colin Bond 63 y o  male MRN: [id number]                  HPI: Colin Bond is a 63y o  year old male who presents for for screening colonoscopy  REVIEW OF SYSTEMS: Per the HPI, and otherwise unremarkable      Historical Information   Past Medical History:   Diagnosis Date   • Colon cancer screening    • Hemorrhoid     occ rectal bleeding   • History of hypertension     No treatment since -changed diet   • Tinnitus     increased in the right ear   • Wears glasses      Past Surgical History:   Procedure Laterality Date   • ENDOSCOPIC HEMILAMINOTOMY W/ DISCECTOMY LUMBAR     • ROTATOR CUFF REPAIR Right      Social History   Social History     Substance and Sexual Activity   Alcohol Use Yes    Comment: occasionally 1-2 drinks a month      Social History     Substance and Sexual Activity   Drug Use Never     Social History     Tobacco Use   Smoking Status Former   • Packs/day:    • Years:    • Pack years:    • Types: Cigarettes   • Start date:    • Quit date: 2013   • Years since quittin 6   Smokeless Tobacco Never     Family History   Problem Relation Age of Onset   • No Known Problems Mother    • Bone cancer Father    • Mental illness Neg Hx        Meds/Allergies       Current Outpatient Medications:   •  cholecalciferol (VITAMIN D3) 1,000 units tablet  •  clonazePAM (KlonoPIN) 0 5 MG disintegrating tablet  •  Digestive Enzymes (DIGESTIVE ENZYME PO)  •  multivitamin (THERAGRAN) TABS  •  psyllium (METAMUCIL) 58 6 % packet  •  Polyethylene Glycol 3350 (MIRALAX PO)    Current Facility-Administered Medications:   •  lactated ringers infusion, 75 mL/hr, Intravenous, Continuous, 75 mL/hr at 23 0712    No Known Allergies    Objective     /95   Pulse 67   Temp 98 1 °F (36 7 °C)   Resp 18   SpO2 98%       PHYSICAL EXAM    Gen: NAD  Head: NCAT  CV: RRR  CHEST: Clear  ABD: soft, NT/ND  EXT: no edema      ASSESSMENT/PLAN:  This is a 63y o  year old male here for colonoscopy, and he is stable and optimized for his procedure

## 2023-03-19 PROBLEM — Z12.11 COLON CANCER SCREENING: Status: RESOLVED | Noted: 2023-01-18 | Resolved: 2023-03-19

## 2023-03-20 ENCOUNTER — TELEPHONE (OUTPATIENT)
Dept: FAMILY MEDICINE CLINIC | Facility: CLINIC | Age: 63
End: 2023-03-20

## 2023-03-22 NOTE — RESULT ENCOUNTER NOTE
Patient was informed via my chart biopsies were benign  Repeat colonoscopy 1 year secondary to proctitis  The patient also had a serrated adenoma removed

## 2023-05-08 DIAGNOSIS — F41.0 PANIC ATTACK: ICD-10-CM

## 2023-05-08 DIAGNOSIS — F41.9 ANXIETY: ICD-10-CM

## 2023-05-08 RX ORDER — CLONAZEPAM 0.5 MG/1
0.5 TABLET, ORALLY DISINTEGRATING ORAL DAILY PRN
Qty: 30 TABLET | Refills: 3 | Status: SHIPPED | OUTPATIENT
Start: 2023-05-08

## 2023-05-09 DIAGNOSIS — F41.9 ANXIETY: ICD-10-CM

## 2023-05-09 RX ORDER — PROPRANOLOL HYDROCHLORIDE 20 MG/1
TABLET ORAL
Qty: 90 TABLET | Refills: 3 | Status: SHIPPED | OUTPATIENT
Start: 2023-05-09

## 2023-05-15 ENCOUNTER — OFFICE VISIT (OUTPATIENT)
Dept: FAMILY MEDICINE CLINIC | Facility: CLINIC | Age: 63
End: 2023-05-15

## 2023-05-15 VITALS
BODY MASS INDEX: 29.2 KG/M2 | DIASTOLIC BLOOD PRESSURE: 80 MMHG | OXYGEN SATURATION: 96 % | HEART RATE: 93 BPM | TEMPERATURE: 97.8 F | HEIGHT: 70 IN | WEIGHT: 204 LBS | RESPIRATION RATE: 16 BRPM | SYSTOLIC BLOOD PRESSURE: 140 MMHG

## 2023-05-15 DIAGNOSIS — F41.9 ANXIETY: ICD-10-CM

## 2023-05-15 DIAGNOSIS — I65.23 ATHEROSCLEROSIS OF BOTH CAROTID ARTERIES: Primary | ICD-10-CM

## 2023-05-15 DIAGNOSIS — K52.9 INFLAMMATORY BOWEL DISEASE: ICD-10-CM

## 2023-05-15 RX ORDER — PREDNISONE 20 MG/1
40 TABLET ORAL DAILY
Qty: 10 TABLET | Refills: 0 | Status: SHIPPED | OUTPATIENT
Start: 2023-05-15 | End: 2023-05-20

## 2023-05-22 ENCOUNTER — RA CDI HCC (OUTPATIENT)
Dept: OTHER | Facility: HOSPITAL | Age: 63
End: 2023-05-22

## 2023-05-22 NOTE — PROGRESS NOTES
[facility]  coding opportunities       Chart reviewed, no opportunity found: CHART REVIEWED, NO OPPORTUNITY FOUND        Patients Insurance        Commercial Insurance: Shreya Salguero

## 2023-06-01 DIAGNOSIS — K52.9 INFLAMMATORY BOWEL DISEASE: Primary | ICD-10-CM

## 2023-06-01 RX ORDER — PREDNISONE 10 MG/1
TABLET ORAL
Qty: 40 TABLET | Refills: 0 | Status: SHIPPED | OUTPATIENT
Start: 2023-06-01

## 2023-06-08 LAB
ALBUMIN SERPL-MCNC: 4.4 G/DL (ref 3.8–4.8)
ALBUMIN/GLOB SERPL: 1.9 {RATIO} (ref 1.2–2.2)
ALP SERPL-CCNC: 67 IU/L (ref 44–121)
ALT SERPL-CCNC: 13 IU/L (ref 0–44)
AST SERPL-CCNC: 14 IU/L (ref 0–40)
BILIRUB SERPL-MCNC: 0.3 MG/DL (ref 0–1.2)
BUN SERPL-MCNC: 17 MG/DL (ref 8–27)
BUN/CREAT SERPL: 17 (ref 10–24)
CALCIUM SERPL-MCNC: 9.5 MG/DL (ref 8.6–10.2)
CHLORIDE SERPL-SCNC: 100 MMOL/L (ref 96–106)
CHOLEST SERPL-MCNC: 241 MG/DL (ref 100–199)
CO2 SERPL-SCNC: 24 MMOL/L (ref 20–29)
CREAT SERPL-MCNC: 1.02 MG/DL (ref 0.76–1.27)
EGFR: 84 ML/MIN/1.73
ERYTHROCYTE [DISTWIDTH] IN BLOOD BY AUTOMATED COUNT: 13 % (ref 11.6–15.4)
GLOBULIN SER-MCNC: 2.3 G/DL (ref 1.5–4.5)
GLUCOSE SERPL-MCNC: 83 MG/DL (ref 70–99)
HCT VFR BLD AUTO: 42.8 % (ref 37.5–51)
HDLC SERPL-MCNC: 68 MG/DL
HGB BLD-MCNC: 14.4 G/DL (ref 13–17.7)
LDLC SERPL CALC-MCNC: 149 MG/DL (ref 0–99)
LDLC/HDLC SERPL: 2.2 RATIO (ref 0–3.6)
MCH RBC QN AUTO: 30 PG (ref 26.6–33)
MCHC RBC AUTO-ENTMCNC: 33.6 G/DL (ref 31.5–35.7)
MCV RBC AUTO: 89 FL (ref 79–97)
MICRODELETION SYND BLD/T FISH: NORMAL
PLATELET # BLD AUTO: 265 X10E3/UL (ref 150–450)
POTASSIUM SERPL-SCNC: 4.4 MMOL/L (ref 3.5–5.2)
PROT SERPL-MCNC: 6.7 G/DL (ref 6–8.5)
PSA SERPL-MCNC: 1.9 NG/ML (ref 0–4)
RBC # BLD AUTO: 4.8 X10E6/UL (ref 4.14–5.8)
SL AMB REFLEX CRITERIA: NORMAL
SL AMB VLDL CHOLESTEROL CALC: 24 MG/DL (ref 5–40)
SODIUM SERPL-SCNC: 139 MMOL/L (ref 134–144)
TRIGL SERPL-MCNC: 136 MG/DL (ref 0–149)
WBC # BLD AUTO: 13.1 X10E3/UL (ref 3.4–10.8)

## 2023-06-09 ENCOUNTER — OFFICE VISIT (OUTPATIENT)
Dept: FAMILY MEDICINE CLINIC | Facility: CLINIC | Age: 63
End: 2023-06-09
Payer: COMMERCIAL

## 2023-06-09 ENCOUNTER — TELEPHONE (OUTPATIENT)
Dept: GASTROENTEROLOGY | Facility: CLINIC | Age: 63
End: 2023-06-09

## 2023-06-09 VITALS
WEIGHT: 202 LBS | OXYGEN SATURATION: 97 % | BODY MASS INDEX: 28.92 KG/M2 | HEART RATE: 75 BPM | SYSTOLIC BLOOD PRESSURE: 120 MMHG | TEMPERATURE: 97.5 F | DIASTOLIC BLOOD PRESSURE: 80 MMHG | RESPIRATION RATE: 16 BRPM | HEIGHT: 70 IN

## 2023-06-09 DIAGNOSIS — Z13.6 SCREENING FOR CARDIOVASCULAR CONDITION: ICD-10-CM

## 2023-06-09 DIAGNOSIS — I10 BENIGN ESSENTIAL HYPERTENSION: Primary | ICD-10-CM

## 2023-06-09 DIAGNOSIS — E78.2 MIXED HYPERLIPIDEMIA: ICD-10-CM

## 2023-06-09 DIAGNOSIS — K62.89 PROCTITIS: Primary | ICD-10-CM

## 2023-06-09 DIAGNOSIS — K52.9 INFLAMMATORY BOWEL DISEASE: ICD-10-CM

## 2023-06-09 DIAGNOSIS — Z23 NEED FOR VACCINATION: ICD-10-CM

## 2023-06-09 PROCEDURE — 99214 OFFICE O/P EST MOD 30 MIN: CPT | Performed by: FAMILY MEDICINE

## 2023-06-09 PROCEDURE — 90471 IMMUNIZATION ADMIN: CPT

## 2023-06-09 PROCEDURE — 90750 HZV VACC RECOMBINANT IM: CPT

## 2023-06-09 RX ORDER — HYDROCORTISONE ACETATE 25 MG/1
25 SUPPOSITORY RECTAL 2 TIMES DAILY
Qty: 24 SUPPOSITORY | Refills: 1 | Status: SHIPPED | OUTPATIENT
Start: 2023-06-09

## 2023-06-09 NOTE — TELEPHONE ENCOUNTER
SPOKE WITH PT, RECENT COLONOSCOPY IN MARCH, CANASA SUPPOSITORIES ORDERED FOR PROCTITIS  PT STATES MINIMAL IMPROVEMENT WITH THIS TX, HAS BEEN HAVING 5-7 URGENT BM'S DAILY- MOVEMENTS ARE DIARRHEA VS GAS WITH BLOOD AND MUCOUS  SAW PCP RECENTLY PREDNISONE TAPER GIVEN, NOT IMPROVED

## 2023-06-09 NOTE — PROGRESS NOTES
Name: Cherylene Grave      : 1960      MRN: [id number]  Encounter Provider: Pietro Tran DO  Encounter Date: 2023   Encounter department: [address]     1  Benign essential hypertension  Assessment & Plan:  Improved with lifestyle changes     Orders:  -     CBC; Future; Expected date: 2023  -     Comprehensive metabolic panel; Future; Expected date: 2023  -     Lipid Panel with Direct LDL reflex; Future; Expected date: 2023  -     CBC  -     Comprehensive metabolic panel  -     Lipid Panel with Direct LDL reflex    2  Mixed hyperlipidemia  Assessment & Plan:  Heart attack risks discussed  Recommended that he start cholesterol medication but, he is worried about the risks of cholesterol medication  Will get coronary calcium score to further evaluate his risk     Orders:  -     Comprehensive metabolic panel; Future; Expected date: 2023  -     Lipid Panel with Direct LDL reflex; Future; Expected date: 2023  -     Comprehensive metabolic panel  -     Lipid Panel with Direct LDL reflex    3  Inflammatory bowel disease  Assessment & Plan:  Still having bleeding  Prednisone didn't help   Needs follow up with GI    Orders:  -     Ambulatory referral to Gastroenterology; Future    4  Screening for cardiovascular condition  -     CT coronary calcium score; Future; Expected date: 2023    5  Need for vaccination  -     Zoster Vaccine Recombinant IM        Return in about 6 months (around 2023) for Annual physical       Subjective      He is still having problems with his GI tract  He is having oily flatus and is still having intermittent bleeding  There was steroids did not help him  He has been continue to follow good diet and is losing weight      Review of Systems    Current Outpatient Medications on File Prior to Visit   Medication Sig   • cholecalciferol (VITAMIN D3) 1,000 units tablet Take 1,000 Units by mouth daily   • "clonazePAM (KlonoPIN) 0 5 MG disintegrating tablet Take 1 tablet (0 5 mg total) by mouth daily as needed for anxiety   • Digestive Enzymes (DIGESTIVE ENZYME PO) Take by mouth in the morning   • mesalamine (CANASA) 1,000 mg suppository Insert 1 suppository (1,000 mg total) into the rectum daily at bedtime   • multivitamin (THERAGRAN) TABS Take 1 tablet by mouth daily Oral liquid   • Polyethylene Glycol 3350 (MIRALAX PO) Take by mouth 1 (one) time 238 gm with dulcolax   • predniSONE 10 mg tablet Take 4 daily for 4 days, then 3 daily for 4 days, then 2 daily for 4 days, then 1 daily for 4 days  Take with food  • psyllium (METAMUCIL) 58 6 % packet Take 1 packet by mouth daily   • [DISCONTINUED] propranolol (INDERAL) 20 mg tablet TAKE ONE TABLET BY MOUTH 3 TIMES A DAY       Objective     /80 (BP Location: Right arm, Patient Position: Sitting, Cuff Size: Large)   Pulse 75   Temp 97 5 °F (36 4 °C) (Temporal)   Resp 16   Ht 5' 10\" (1 778 m)   Wt 91 6 kg (202 lb)   SpO2 97%   BMI 28 98 kg/m²     Physical Exam  Vitals and nursing note reviewed  Constitutional:       Appearance: He is well-developed  HENT:      Head: Normocephalic and atraumatic  Right Ear: Tympanic membrane and external ear normal       Left Ear: Tympanic membrane and external ear normal    Cardiovascular:      Rate and Rhythm: Normal rate and regular rhythm  Heart sounds: Normal heart sounds  No murmur heard  Pulmonary:      Effort: Pulmonary effort is normal  No respiratory distress  Breath sounds: Normal breath sounds  No wheezing or rales  Musculoskeletal:      Right lower leg: No edema  Left lower leg: No edema         Mandi Brown DO"

## 2023-06-09 NOTE — TELEPHONE ENCOUNTER
Patients GI provider:  Dr Hutchinson     Number to return call: [phone]    Reason for call: Pt calling because he states he is having multiple bowel movements a day, blood and mucous in his stool, and diarrhea   He would like to be seen as soon as possible    Scheduled procedure/appointment date if applicable: N/A

## 2023-06-09 NOTE — TELEPHONE ENCOUNTER
LMOM FOR PT WITH DETAILED RECOMMENDATIONS, BW, STOOL STUDIES, ANUSOL SUPP SENT TO PHARMACY, PLEASE CALL BACK IF ANY QUESTIONS

## 2023-06-09 NOTE — ASSESSMENT & PLAN NOTE
Heart attack risks discussed  Recommended that he start cholesterol medication but, he is worried about the risks of cholesterol medication     Will get coronary calcium score to further evaluate his risk

## 2023-06-12 ENCOUNTER — TELEPHONE (OUTPATIENT)
Dept: GASTROENTEROLOGY | Facility: CLINIC | Age: 63
End: 2023-06-12

## 2023-06-13 ENCOUNTER — APPOINTMENT (OUTPATIENT)
Dept: LAB | Facility: CLINIC | Age: 63
End: 2023-06-13
Payer: COMMERCIAL

## 2023-06-13 DIAGNOSIS — K62.89 PROCTITIS: Primary | ICD-10-CM

## 2023-06-13 LAB — CRP SERPL QL: <3 MG/L

## 2023-06-13 PROCEDURE — 36415 COLL VENOUS BLD VENIPUNCTURE: CPT

## 2023-06-13 PROCEDURE — 86140 C-REACTIVE PROTEIN: CPT

## 2023-06-14 ENCOUNTER — APPOINTMENT (OUTPATIENT)
Dept: LAB | Facility: CLINIC | Age: 63
End: 2023-06-14
Payer: COMMERCIAL

## 2023-06-14 DIAGNOSIS — K62.89 PROCTITIS: Primary | ICD-10-CM

## 2023-06-14 PROCEDURE — 87505 NFCT AGENT DETECTION GI: CPT

## 2023-06-14 PROCEDURE — 83993 ASSAY FOR CALPROTECTIN FECAL: CPT

## 2023-06-14 PROCEDURE — 87209 SMEAR COMPLEX STAIN: CPT

## 2023-06-14 PROCEDURE — 87177 OVA AND PARASITES SMEARS: CPT

## 2023-06-15 LAB
C DIFF TOX GENS STL QL NAA+PROBE: NEGATIVE
CAMPYLOBACTER DNA SPEC NAA+PROBE: NORMAL
SALMONELLA DNA SPEC QL NAA+PROBE: NORMAL
SHIGA TOXIN STX GENE SPEC NAA+PROBE: NORMAL
SHIGELLA DNA SPEC QL NAA+PROBE: NORMAL

## 2023-06-21 ENCOUNTER — TELEPHONE (OUTPATIENT)
Dept: GASTROENTEROLOGY | Facility: CLINIC | Age: 63
End: 2023-06-21

## 2023-06-21 NOTE — TELEPHONE ENCOUNTER
----- Message from Oni Covington PA-C sent at 6/21/2023 11:44 AM EDT -----  Please let patient know that stool studies were negative for infection

## 2023-06-22 ENCOUNTER — HOSPITAL ENCOUNTER (OUTPATIENT)
Dept: CT IMAGING | Facility: HOSPITAL | Age: 63
Discharge: HOME | End: 2023-06-22
Payer: COMMERCIAL

## 2023-06-22 ENCOUNTER — TELEPHONE (OUTPATIENT)
Dept: GASTROENTEROLOGY | Facility: CLINIC | Age: 63
End: 2023-06-22

## 2023-06-22 DIAGNOSIS — Z13.6 SCREENING FOR CARDIOVASCULAR CONDITION: ICD-10-CM

## 2023-06-22 LAB — CALPROTECTIN STL-MCNT: 347 UG/G (ref 0–120)

## 2023-06-22 PROCEDURE — G1004 CDSM NDSC: HCPCS

## 2023-06-22 PROCEDURE — 75571 CT HRT W/O DYE W/CA TEST: CPT

## 2023-06-22 NOTE — TELEPHONE ENCOUNTER
----- Message from Dalton Fang PA-C sent at 6/22/2023 10:16 AM EDT -----  Patient fecal calprotectin is elevated, can we please discuss with patient and see how he is feeling because this was from tasks and his he was having diarrhea and rectal bleeding cytosol to follow-up on his symptoms

## 2023-06-27 ENCOUNTER — OFFICE VISIT (OUTPATIENT)
Dept: GASTROENTEROLOGY | Facility: CLINIC | Age: 63
End: 2023-06-27
Payer: COMMERCIAL

## 2023-06-27 ENCOUNTER — TELEPHONE (OUTPATIENT)
Age: 63
End: 2023-06-27

## 2023-06-27 VITALS
WEIGHT: 202 LBS | SYSTOLIC BLOOD PRESSURE: 142 MMHG | HEART RATE: 73 BPM | BODY MASS INDEX: 28.92 KG/M2 | DIASTOLIC BLOOD PRESSURE: 90 MMHG | HEIGHT: 70 IN

## 2023-06-27 DIAGNOSIS — K51.211 ULCERATIVE PROCTITIS WITH RECTAL BLEEDING (HCC): Primary | ICD-10-CM

## 2023-06-27 PROCEDURE — 99214 OFFICE O/P EST MOD 30 MIN: CPT | Performed by: INTERNAL MEDICINE

## 2023-06-27 RX ORDER — MESALAMINE 4 G/60ML
4 ENEMA RECTAL
Qty: 28 ENEMA | Refills: 2 | Status: SHIPPED | OUTPATIENT
Start: 2023-06-27

## 2023-06-27 RX ORDER — MESALAMINE 1.2 G/1
3600 TABLET, DELAYED RELEASE ORAL
Qty: 90 TABLET | Refills: 5 | Status: SHIPPED | OUTPATIENT
Start: 2023-06-27

## 2023-06-27 NOTE — PROGRESS NOTES
[facility] - Outpatient Follow-up Note  Laya Cochran 66 y o  male MRN: [id number]  Encounter: [id number]          ASSESSMENT AND PLAN:      1  Ulcerative proctitis with rectal bleeding (HCC)  Clinical picture most suggestive of ulcerative proctitis with persistent symptoms despite treatment as noted below  Discussed options and will plan to treat with a combination of oral and rectal mesalamine in the form of enemas in case there is some residual inflammation more proximally that was not adequately addressed by the suppository  If this is ineffective, another course of higher dose prednisone would likely be a consideration  No symptoms to suggest active small bowel involvement or Crohn's disease    - mesalamine (ROWASA) 4 g; Insert 60 mL (4 g total) into the rectum daily at bedtime  Dispense: 28 enema; Refill: 2  - mesalamine (LIALDA) 1 2 g EC tablet; Take 3 tablets (3 6 g total) by mouth daily with breakfast  Dispense: 90 tablet; Refill: 5    ______________________________________________________________________    SUBJECTIVE: Patient returns in follow-up of rectal bleeding and diarrhea  Underwent colonoscopy with evidence of distal proctitis with nonspecific biopsies  Treated with a course of Canasa suppositories with minimal improvement, followed by a course of low-dose prednisone and subsequently hydrocortisone suppositories, finally with improvement but not resolution of symptoms after 2 weeks  Completed this course several days ago and has had persistent symptoms, primarily of multiple bowel movements during the day but some continued bleeding as well  CRP was normal but fecal calprotectin is elevated  No family history of gastrointestinal disease  No evidence of underlying infection  Terminal ileum appeared normal on colonoscopy    REVIEW OF SYSTEMS:    Review of Systems   HENT: Negative for sore throat  Cardiovascular: Negative for chest pain     Respiratory: Negative for shortness of breath  Skin: Negative for rash      Answers for HPI/ROS submitted by the patient on 6/27/2023  When you are not experiencing symptoms of your inflammatory bowel disease, how many bowel movements do you typically have each day?: 2  What is the average (typical) number of bowel movements that you had in a single day during the last week?: 5  Over the last 3 days, have you had any bowel movements where you passed blood without stool?: Yes  Since your last visit, have you received any vaccinations?: Yes  Since the last visit, have you had an infection?: No  In the past three months, have you used tobacco in any form?: No  During the last year, how many days have you missed work or school because of your inflammatory bowel disease?: 1  During the last year, how many days have you been hospitalized because of your inflammatory bowel disease?: 0  During the last year, how many days have you visited a hospital emergency department because of your inflammatory bowel disease?: 0  During the last month, have you taken narcotic pain medications (such as Percocet, oxycodone, Oxycontin, morphine, Vicodin, Dilaudid, MS Contin) for your inflammatory bowel disease?: No  Have you awoken at night because you needed to move your bowels during the last month? : Yes  Have you had leakage of stool while sleeping during the last month?: No  Have you had leakage of stool while you were awake during the last month?: Yes  In the last 6 months, have you unintentionally lost weight?: No  Fever: No  Eye irritation: No  Mouth sores: No  Numbness or tingling in your hands or feet: No  Pain or swelling in your joints: No  Bruising or bleeding: No  Felt depressed or blue: No          Historical Information   Past Medical History:   Diagnosis Date   • Colon cancer screening    • Hemorrhoid     occ rectal bleeding   • History of hypertension     No treatment since 11/23-changed diet   • Tinnitus     increased in the right "ear   • Wears glasses      Past Surgical History:   Procedure Laterality Date   • ENDOSCOPIC HEMILAMINOTOMY W/ DISCECTOMY LUMBAR     • ROTATOR CUFF REPAIR Right      Social History   Social History     Substance and Sexual Activity   Alcohol Use Yes    Comment: occasionally 1-2 drinks a month      Social History     Substance and Sexual Activity   Drug Use Never     Social History     Tobacco Use   Smoking Status Former   • Packs/day: 1 00   • Years: 25 00   • Total pack years: 25 00   • Types: Cigarettes   • Start date: 20   • Quit date: 2013   • Years since quittin 9   • Passive exposure: Past   Smokeless Tobacco Never     Family History   Problem Relation Age of Onset   • No Known Problems Mother    • Bone cancer Father    • Mental illness Neg Hx        Meds/Allergies       Current Outpatient Medications:   •  clonazePAM (KlonoPIN) 0 5 MG disintegrating tablet  •  Digestive Enzymes (DIGESTIVE ENZYME PO)  •  mesalamine (LIALDA) 1 2 g EC tablet  •  mesalamine (ROWASA) 4 g  •  multivitamin (THERAGRAN) TABS  •  cholecalciferol (VITAMIN D3) 1,000 units tablet  •  hydrocortisone (ANUSOL-HC) 25 mg suppository  •  Polyethylene Glycol 3350 (MIRALAX PO)  •  predniSONE 10 mg tablet  •  psyllium (METAMUCIL) 58 6 % packet    No Known Allergies        Objective     Blood pressure 142/90, pulse 73, height 5' 10\" (1 778 m), weight 91 6 kg (202 lb)  Body mass index is 28 98 kg/m²  PHYSICAL EXAM:      Physical Exam     Lab Results:   No visits with results within 1 Day(s) from this visit     Latest known visit with results is:   Appointment on 2023   Component Date Value   • Calprotectin 2023 347 (H)    • C difficile toxin by PCR 2023 Negative    • Salmonella sp PCR 2023 None Detected    • Shigella sp/Enteroinvasi* 2023 None Detected    • Campylobacter sp (jejuni* 2023 None Detected    • Shiga toxin 1/Shiga toxi* 2023 None Detected          Radiology Results:   CT coronary " calcium score    Result Date: 6/25/2023  Narrative: CT CORONARY ARTERY CALCIUM SCREENING WITHOUT INTRAVENOUS CONTRAST INDICATION:  Z13 6: Encounter for screening for cardiovascular disorders  Assess for calcific coronary plaque  Patient Age:  66 years Patient Gender:  Male  COMPARISON: Prior chest x-ray performed January 2, 2022 TECHNIQUE: Low radiation dose computed tomography of the heart was performed with EKG gating, suspended respiration, and without intravenous contrast   This examination, like all CT scans performed in the [facility], was performed utilizing techniques to minimize radiation dose exposure, including the use of iterative reconstruction and automated exposure control  Postprocessing was performed on a 3-D computer workstation to measure the amount of calcium in the coronary arteries  Imaging was limited to the heart and the immediately adjacent lungs  FINDINGS: Coronary artery calcium score breakdown is as follows: Left main coronary artery:  0 Left anterior descending coronary artery and diagonal branches:  0 Left circumflex coronary artery and left marginal branches:  0 Right coronary artery and right marginal branches:  0 Posterior descending coronary artery: 0 TOTAL coronary calcium score: 0 The PROBABILITY of a non-zero calcium score in participants in the in the Multi-Ethnic Study of Atherosclerosis (GUZMAN) trial matched to this patient's age, race and gender is:   70% HEART/GREAT VESSELS: No significant low radiation dose noncontrast CT abnormality of the heart  No thoracic aortic aneurysm  EXTRACARDIAC FINDINGS: No significant findings identified on limited small field of view low radiation dose noncontrast images of the chest at the level of the heart  Impression: Total coronary calcium score equals 0   For more useful information regarding the prognostic significance of the calcium score, please consult the calculator at the website https://www sayda org/  aspx   Workstation performed: [id number]

## 2023-06-27 NOTE — TELEPHONE ENCOUNTER
[facility] called to confirm quantity of mesalamine enema     Verbal order for 30 enemas (1800 ml)

## 2023-06-30 DIAGNOSIS — R21 RASH: Primary | ICD-10-CM

## 2023-06-30 RX ORDER — KETOCONAZOLE 20 MG/G
CREAM TOPICAL DAILY
Qty: 60 G | Refills: 0 | Status: SHIPPED | OUTPATIENT
Start: 2023-06-30

## 2023-08-08 DIAGNOSIS — F41.9 ANXIETY: ICD-10-CM

## 2023-08-08 RX ORDER — PROPRANOLOL HYDROCHLORIDE 20 MG/1
TABLET ORAL
Qty: 90 TABLET | Refills: 3 | OUTPATIENT
Start: 2023-08-08

## 2023-08-10 ENCOUNTER — NURSE TRIAGE (OUTPATIENT)
Age: 63
End: 2023-08-10

## 2023-08-10 NOTE — TELEPHONE ENCOUNTER
SPOKE WITH PT, HAS BEEN TAKING LIALDA 3 TABS IN AM AND ENEMAS IN PM FOR 5-6 WEEKS, WAS FEELING MUCH BETTER WITH MINIMAL SYMPTOMS- FOR THE PAST 4 DAYS HAS BEEN EXPERIENCING INTERMITTENT BELT LINE ABDOMINAL PAIN/CRAMPING WITH OCCASIONAL BRBPR. DENIES FEVER, CHILLS, N/V. PLEASE ADVISE.

## 2023-08-10 NOTE — TELEPHONE ENCOUNTER
----- Message from Sam Perrin sent at 8/10/2023  1:29 PM EDT -----  Just missed your call - . Would like you to reach back out to him today when you can.

## 2023-08-10 NOTE — TELEPHONE ENCOUNTER
----- Message from Eloisegiovani Octavio sent at 8/10/2023 12:52 PM EDT -----  Pt calling in stating he was in about 5-6 weeks ago and the doctor started him on Mesalamine, it was working great and all of a sudden the last few days he has very terrible stomach pain. The pain was so intense last night it was making him sweat. Pt does not know if the medication stopped working or if he is experiencing side effects. Pt asking for a nurse call back at [phone].

## 2023-08-15 DIAGNOSIS — K51.211 ULCERATIVE PROCTITIS WITH RECTAL BLEEDING (HCC): Primary | ICD-10-CM

## 2023-08-15 RX ORDER — PREDNISONE 10 MG/1
TABLET ORAL
Qty: 53 TABLET | Refills: 0 | Status: SHIPPED | OUTPATIENT
Start: 2023-08-15 | End: 2023-09-09

## 2023-08-16 NOTE — TELEPHONE ENCOUNTER
I called and spoke to patient. I went over recommendations given by Dr Angelique Sierra. He verbalized understanding. Ov scheduled 8/25/23. Offered sooner appt however he wants to wait until the appt with Dr Angelique Sierra. He will c/b if any issues prior to appt.

## 2023-08-25 ENCOUNTER — OFFICE VISIT (OUTPATIENT)
Dept: GASTROENTEROLOGY | Facility: CLINIC | Age: 63
End: 2023-08-25
Payer: COMMERCIAL

## 2023-08-25 VITALS
HEIGHT: 70 IN | DIASTOLIC BLOOD PRESSURE: 105 MMHG | SYSTOLIC BLOOD PRESSURE: 157 MMHG | BODY MASS INDEX: 28.77 KG/M2 | WEIGHT: 201 LBS | HEART RATE: 71 BPM

## 2023-08-25 DIAGNOSIS — K51.211 ULCERATIVE PROCTITIS WITH RECTAL BLEEDING (HCC): ICD-10-CM

## 2023-08-25 DIAGNOSIS — K52.9 INFLAMMATORY BOWEL DISEASE: ICD-10-CM

## 2023-08-25 PROCEDURE — 99213 OFFICE O/P EST LOW 20 MIN: CPT | Performed by: INTERNAL MEDICINE

## 2023-08-25 RX ORDER — MESALAMINE 1.2 G/1
3600 TABLET, DELAYED RELEASE ORAL
Qty: 270 TABLET | Refills: 1 | Status: SHIPPED | OUTPATIENT
Start: 2023-08-25

## 2023-08-25 NOTE — PROGRESS NOTES
[facility] - Outpatient Follow-up Note  Gopi Montero 66 y.o. male MRN: [id number]  Encounter: [id number]          ASSESSMENT AND PLAN:      1. Inflammatory bowel disease  2. Ulcerative proctitis with rectal bleeding (HCC)  Symptoms markedly improved with a taper of prednisone, as well as oral and rectal mesalamine. At this time we will have him continue to taper off of prednisone over the next week or 2. Can discontinue enemas and we will plan to continue oral Lialda 3.6 g daily indefinitely. We will follow-up here in a few months    - mesalamine (LIALDA) 1.2 g EC tablet; Take 3 tablets (3.6 g total) by mouth daily with breakfast  Dispense: 270 tablet; Refill: 1    ______________________________________________________________________    SUBJECTIVE: Returns in follow-up of ulcerative proctitis. This was slow to improve with mesalamine but he was feeling well on a combination of oral Lialda 3.6 g daily as well as nightly Rowasa enemas. He ran out of these for a few days and had recurrent symptoms with fairly severe lower abdominal crampy pain. Refilled these and started a taper of prednisone and his symptoms have now once again resolved with no abdominal pain or bleeding. Moving his bowels twice daily without difficulty.       REVIEW OF SYSTEMS:    ROS       Historical Information   Past Medical History:   Diagnosis Date   • Colon cancer screening    • Hemorrhoid     occ rectal bleeding   • History of hypertension     No treatment since 11/23-changed diet   • Tinnitus     increased in the right ear   • Wears glasses      Past Surgical History:   Procedure Laterality Date   • ENDOSCOPIC HEMILAMINOTOMY W/ DISCECTOMY LUMBAR     • ROTATOR CUFF REPAIR Right      Social History   Social History     Substance and Sexual Activity   Alcohol Use Yes    Comment: occasionally 1-2 drinks a month      Social History     Substance and Sexual Activity   Drug Use Never     Social History Tobacco Use   Smoking Status Former   • Packs/day: 1.00   • Years: 25.00   • Total pack years: 25.00   • Types: Cigarettes   • Start date: 20   • Quit date: 7/8/2013   • Years since quitting: 10.1   • Passive exposure: Past   Smokeless Tobacco Never     Family History   Problem Relation Age of Onset   • No Known Problems Mother    • Bone cancer Father    • Mental illness Neg Hx        Meds/Allergies       Current Outpatient Medications:   •  clonazePAM (KlonoPIN) 0.5 MG disintegrating tablet  •  Digestive Enzymes (DIGESTIVE ENZYME PO)  •  ketoconazole (NIZORAL) 2 % cream  •  mesalamine (LIALDA) 1.2 g EC tablet  •  mesalamine (ROWASA) 4 g  •  multivitamin (THERAGRAN) TABS  •  predniSONE 10 mg tablet  •  cholecalciferol (VITAMIN D3) 1,000 units tablet  •  hydrocortisone (ANUSOL-HC) 25 mg suppository    No Known Allergies        Objective     Blood pressure (!) 157/105, pulse 71, height 5' 10" (1.778 m), weight 91.2 kg (201 lb). Body mass index is 28.84 kg/m². PHYSICAL EXAM:      Physical Exam  Vitals and nursing note reviewed. Constitutional:       General: He is not in acute distress. HENT:      Head: Normocephalic and atraumatic. Mouth/Throat:      Mouth: Mucous membranes are moist.   Eyes:      General: No scleral icterus. Pupils: Pupils are equal, round, and reactive to light. Cardiovascular:      Rate and Rhythm: Normal rate. Pulmonary:      Effort: Pulmonary effort is normal. No respiratory distress. Abdominal:      General: There is no distension. Musculoskeletal:         General: Normal range of motion. Cervical back: Normal range of motion and neck supple. Skin:     General: Skin is warm and dry. Neurological:      General: No focal deficit present. Mental Status: He is alert and oriented to person, place, and time.    Psychiatric:         Mood and Affect: Mood normal.         Behavior: Behavior normal.          Lab Results:   No visits with results within 1 Day(s) from this visit. Latest known visit with results is:   Appointment on 06/14/2023   Component Date Value   • Calprotectin 06/14/2023 347 (H)    • C.difficile toxin by PCR 06/14/2023 Negative    • Salmonella sp PCR 06/14/2023 None Detected    • Shigella sp/Enteroinvasi* 06/14/2023 None Detected    • Campylobacter sp (jejuni* 06/14/2023 None Detected    • Shiga toxin 1/Shiga toxi* 06/14/2023 None Detected          Radiology Results:   No results found.

## 2023-09-12 DIAGNOSIS — F41.9 ANXIETY: ICD-10-CM

## 2023-09-12 DIAGNOSIS — F41.0 PANIC ATTACK: ICD-10-CM

## 2023-09-12 RX ORDER — CLONAZEPAM 0.5 MG/1
0.5 TABLET, ORALLY DISINTEGRATING ORAL DAILY PRN
Qty: 30 TABLET | Refills: 3 | Status: SHIPPED | OUTPATIENT
Start: 2023-09-12

## 2023-11-09 ENCOUNTER — TELEPHONE (OUTPATIENT)
Age: 63
End: 2023-11-09

## 2023-11-09 ENCOUNTER — OFFICE VISIT (OUTPATIENT)
Dept: GASTROENTEROLOGY | Facility: CLINIC | Age: 63
End: 2023-11-09
Payer: COMMERCIAL

## 2023-11-09 VITALS
HEIGHT: 70 IN | BODY MASS INDEX: 28.35 KG/M2 | WEIGHT: 198 LBS | DIASTOLIC BLOOD PRESSURE: 83 MMHG | HEART RATE: 84 BPM | SYSTOLIC BLOOD PRESSURE: 139 MMHG

## 2023-11-09 DIAGNOSIS — K51.211 ULCERATIVE PROCTITIS WITH RECTAL BLEEDING (HCC): Primary | ICD-10-CM

## 2023-11-09 PROCEDURE — 99214 OFFICE O/P EST MOD 30 MIN: CPT | Performed by: INTERNAL MEDICINE

## 2023-11-09 RX ORDER — PREDNISONE 10 MG/1
TABLET ORAL
Qty: 50 TABLET | Refills: 0 | Status: SHIPPED | OUTPATIENT
Start: 2023-11-09 | End: 2023-11-29

## 2023-11-09 NOTE — PROGRESS NOTES
[facility] - Outpatient Follow-up Note  Naveed Aparicio 66 y.o. male MRN: [id number]  Encounter: [id number]          ASSESSMENT AND PLAN:      1. Ulcerative proctitis with rectal bleeding (HCC)  Symptoms certainly suggestive of a flare. We discussed options. We will have him increase his oral mesalamine to 4.8 g daily and reinitiate Rowasa enemas. He will be traveling out of state at the end of the month and has requested prescription for a prednisone taper in case the above measures are not adequate to control his symptoms. He will only begin this if mesalamine has had no effect or if his symptoms are worsening. We will follow-up here hopefully in the next month or so. We discussed possible need to transition to a biologic    ______________________________________________________________________    SUBJECTIVE: Aubrey Espinoza returns in follow-up of ulcerative proctitis which was diagnosed earlier this year and initially somewhat difficult to control with 5-ASA. After course of oral prednisone he did achieve remission and did very well on mesalamine 3.6 g daily for approximately 3 months but over the past 2 weeks has once again had symptoms of flare comprising increased frequency of soft stool with bleeding.   No nausea vomiting, fever chills, jaundice or rash, abdominal pain, explosive diarrhea      REVIEW OF SYSTEMS:    ROS       Historical Information   Past Medical History:   Diagnosis Date    Colon cancer screening     Hemorrhoid     occ rectal bleeding    History of hypertension     No treatment since 11/23-changed diet    Tinnitus     increased in the right ear    Wears glasses      Past Surgical History:   Procedure Laterality Date    ENDOSCOPIC HEMILAMINOTOMY W/ DISCECTOMY LUMBAR      ROTATOR CUFF REPAIR Right      Social History   Social History     Substance and Sexual Activity   Alcohol Use Yes    Comment: occasionally 1-2 drinks a month      Social History     Substance and Sexual Activity   Drug Use Never     Social History     Tobacco Use   Smoking Status Former    Packs/day: 1.00    Years: 25.00    Total pack years: 25.00    Types: Cigarettes    Start date: 20    Quit date: 7/8/2013    Years since quitting: 10.3    Passive exposure: Past   Smokeless Tobacco Never     Family History   Problem Relation Age of Onset    No Known Problems Mother     Bone cancer Father     Mental illness Neg Hx        Meds/Allergies       Current Outpatient Medications:     cholecalciferol (VITAMIN D3) 1,000 units tablet    clonazePAM (KlonoPIN) 0.5 MG disintegrating tablet    Digestive Enzymes (DIGESTIVE ENZYME PO)    mesalamine (LIALDA) 1.2 g EC tablet    multivitamin (THERAGRAN) TABS    VITAMIN E BLEND PO    hydrocortisone (ANUSOL-HC) 25 mg suppository    ketoconazole (NIZORAL) 2 % cream    mesalamine (ROWASA) 4 g    No Known Allergies        Objective     Blood pressure 139/83, pulse 84, height 5' 10" (1.778 m), weight 89.8 kg (198 lb). Body mass index is 28.41 kg/m². PHYSICAL EXAM:      Physical Exam  Vitals and nursing note reviewed. Constitutional:       General: He is not in acute distress. HENT:      Head: Normocephalic and atraumatic. Mouth/Throat:      Mouth: Mucous membranes are moist.   Eyes:      General: No scleral icterus. Pupils: Pupils are equal, round, and reactive to light. Cardiovascular:      Rate and Rhythm: Normal rate and regular rhythm. Pulmonary:      Effort: Pulmonary effort is normal. No respiratory distress. Abdominal:      General: There is no distension. Palpations: Abdomen is soft. Tenderness: There is no abdominal tenderness. There is no guarding or rebound. Musculoskeletal:         General: Normal range of motion. Cervical back: Normal range of motion and neck supple. Right lower leg: No edema. Left lower leg: No edema. Skin:     General: Skin is warm and dry. Neurological:      General: No focal deficit present. Mental Status: He is alert and oriented to person, place, and time. Psychiatric:         Mood and Affect: Mood normal.         Behavior: Behavior normal.          Lab Results:   No visits with results within 1 Day(s) from this visit. Latest known visit with results is:   Appointment on 06/14/2023   Component Date Value    Calprotectin 06/14/2023 347 (H)     C.difficile toxin by PCR 06/14/2023 Negative     Salmonella sp PCR 06/14/2023 None Detected     Shigella sp/Enteroinvasi* 06/14/2023 None Detected     Campylobacter sp (jejuni* 06/14/2023 None Detected     Shiga toxin 1/Shiga toxi* 06/14/2023 None Detected          Radiology Results:   No results found.

## 2023-11-12 DIAGNOSIS — K51.211 ULCERATIVE PROCTITIS WITH RECTAL BLEEDING (HCC): ICD-10-CM

## 2023-11-12 RX ORDER — MESALAMINE 1.2 G/1
4800 TABLET, DELAYED RELEASE ORAL
Qty: 360 TABLET | Refills: 1 | Status: SHIPPED | OUTPATIENT
Start: 2023-11-12

## 2023-12-07 ENCOUNTER — OFFICE VISIT (OUTPATIENT)
Dept: GASTROENTEROLOGY | Facility: CLINIC | Age: 63
End: 2023-12-07
Payer: COMMERCIAL

## 2023-12-07 VITALS
SYSTOLIC BLOOD PRESSURE: 133 MMHG | BODY MASS INDEX: 28.92 KG/M2 | HEART RATE: 68 BPM | WEIGHT: 202 LBS | HEIGHT: 70 IN | DIASTOLIC BLOOD PRESSURE: 88 MMHG

## 2023-12-07 DIAGNOSIS — K52.9 INFLAMMATORY BOWEL DISEASE: Primary | ICD-10-CM

## 2023-12-07 PROCEDURE — 99213 OFFICE O/P EST LOW 20 MIN: CPT | Performed by: INTERNAL MEDICINE

## 2023-12-07 NOTE — PROGRESS NOTES
Ellie Cardona [facility] - Outpatient Follow-up Note  Mia Mcwilliams 66 y.o. male MRN: [id number]  Encounter: [id number]          ASSESSMENT AND PLAN:      1. Inflammatory bowel disease  We discussed options and will plan to continue mesalamine 4.8 g daily. Will order labs as below. Discussed possibility of transitioning to a biologic but will hold off on this unless mesalamine is unable to maintain remission. If he has recurrence of mild symptoms he can use Rowasa enemas. - Hepatitis B surface antigen; Future  - Hepatitis B surface antibody; Future  - Hepatitis B core antibody, total; Future  - Quantiferon TB Gold Plus Assay; Future  - CBC; Future  - Comprehensive metabolic panel; Future  - Lipase; Future  - C-reactive protein; Future  - Hepatitis B surface antibody  - CBC  - Comprehensive metabolic panel  - Lipase  - C-reactive protein    ______________________________________________________________________    SUBJECTIVEAntionette Kill returns in follow-up following a recent flare of ulcerative proctitis. This is currently resolved after increasing mesalamine to 4.8 g daily and taking a taper oral prednisone. He is also started using an OTC colostrum supplement. REVIEW OF SYSTEMS:    Review of Systems   HENT:  Negative for sore throat. Cardiovascular:  Negative for chest pain. Respiratory:  Negative for shortness of breath. Skin:  Negative for rash. All other systems reviewed and are negative.      Answers submitted by the patient for this visit:  Inflammatory Bowel Disease (Submitted on 12/7/2023)  When you are not experiencing symptoms of your inflammatory bowel disease, how many bowel movements do you typically have each day?: 2  What is the average (typical) number of bowel movements that you had in a single day during the last week?: 2  Over the last 3 days, have you had any bowel movements where you passed blood without stool?: No  Since your last visit, have you received any vaccinations?: No  Since the last visit, have you had an infection?: No  In the past three months, have you used tobacco in any form?: No  During the last month, have you taken narcotic pain medications (such as Percocet, oxycodone, Oxycontin, morphine, Vicodin, Dilaudid, MS Contin) for your inflammatory bowel disease?: No  Have you awoken at night because you needed to move your bowels during the last month? : Yes  Have you had leakage of stool while sleeping during the last month?: No  Have you had leakage of stool while you were awake during the last month?: No  In the last 6 months, have you unintentionally lost weight?: No  Fever: No  Eye irritation: No  Mouth sores: No  Numbness or tingling in your hands or feet: No  Pain or swelling in your joints: No  Bruising or bleeding: No  Felt depressed or blue: No      Historical Information   Past Medical History:   Diagnosis Date    Colon cancer screening     Hemorrhoid     occ rectal bleeding    History of hypertension     No treatment since 11/23-changed diet    Tinnitus     increased in the right ear    Wears glasses      Past Surgical History:   Procedure Laterality Date    ENDOSCOPIC HEMILAMINOTOMY W/ DISCECTOMY LUMBAR      ROTATOR CUFF REPAIR Right      Social History   Social History     Substance and Sexual Activity   Alcohol Use Yes    Comment: occasionally 1-2 drinks a month      Social History     Substance and Sexual Activity   Drug Use Never     Social History     Tobacco Use   Smoking Status Former    Packs/day: 1.00    Years: 25.00    Total pack years: 25.00    Types: Cigarettes    Start date: 20    Quit date: 7/8/2013    Years since quitting: 10.4    Passive exposure: Past   Smokeless Tobacco Never     Family History   Problem Relation Age of Onset    No Known Problems Mother     Bone cancer Father     Mental illness Neg Hx        Meds/Allergies       Current Outpatient Medications:     cholecalciferol (VITAMIN D3) 1,000 units tablet    clonazePAM (KlonoPIN) 0.5 MG disintegrating tablet    COLOSTRUM PO    Digestive Enzymes (DIGESTIVE ENZYME PO)    ketoconazole (NIZORAL) 2 % cream    mesalamine (LIALDA) 1.2 g EC tablet    multivitamin (THERAGRAN) TABS    VITAMIN E BLEND PO    hydrocortisone (ANUSOL-HC) 25 mg suppository    mesalamine (ROWASA) 4 g    No Known Allergies        Objective     Blood pressure 133/88, pulse 68, height 5' 10" (1.778 m), weight 91.6 kg (202 lb). Body mass index is 28.98 kg/m². PHYSICAL EXAM:      Physical Exam  Vitals and nursing note reviewed. Constitutional:       General: He is not in acute distress. HENT:      Head: Normocephalic and atraumatic. Mouth/Throat:      Mouth: Mucous membranes are moist.   Eyes:      General: No scleral icterus. Pupils: Pupils are equal, round, and reactive to light. Cardiovascular:      Rate and Rhythm: Normal rate and regular rhythm. Pulmonary:      Effort: Pulmonary effort is normal. No respiratory distress. Abdominal:      General: There is no distension. Palpations: Abdomen is soft. Tenderness: There is no abdominal tenderness. There is no guarding or rebound. Musculoskeletal:         General: Normal range of motion. Cervical back: Normal range of motion and neck supple. Right lower leg: No edema. Left lower leg: No edema. Skin:     General: Skin is warm and dry. Neurological:      General: No focal deficit present. Mental Status: He is alert and oriented to person, place, and time. Psychiatric:         Mood and Affect: Mood normal.         Behavior: Behavior normal.          Lab Results:   No visits with results within 1 Day(s) from this visit.    Latest known visit with results is:   Appointment on 06/14/2023   Component Date Value    Calprotectin 06/14/2023 347 (H)     C.difficile toxin by PCR 06/14/2023 Negative     Salmonella sp PCR 06/14/2023 None Detected     Shigella sp/Enteroinvasi* 06/14/2023 None Detected     Campylobacter sp (jejuni* 06/14/2023 None Detected     Shiga toxin 1/Shiga toxi* 06/14/2023 None Detected          Radiology Results:   No results found.

## 2023-12-21 ENCOUNTER — TELEPHONE (OUTPATIENT)
Dept: GASTROENTEROLOGY | Facility: CLINIC | Age: 63
End: 2023-12-21

## 2023-12-21 NOTE — TELEPHONE ENCOUNTER
Patient stopped in office. Patient would like to know if he can do another round of prednisone as he is still having flare up. Patient is having cramping and some bleeding. Patient has follow appointment in February. Please advise. Thank you!

## 2023-12-22 DIAGNOSIS — K52.9 INFLAMMATORY BOWEL DISEASE: Primary | ICD-10-CM

## 2023-12-22 RX ORDER — PREDNISONE 10 MG/1
TABLET ORAL
Qty: 75 TABLET | Refills: 0 | Status: SHIPPED | OUTPATIENT
Start: 2023-12-22

## 2024-01-29 DIAGNOSIS — K52.9 INFLAMMATORY BOWEL DISEASE: ICD-10-CM

## 2024-01-29 RX ORDER — PREDNISONE 10 MG/1
TABLET ORAL
Qty: 75 TABLET | Refills: 0 | Status: SHIPPED | OUTPATIENT
Start: 2024-01-29 | End: 2024-01-30 | Stop reason: SDUPTHER

## 2024-01-30 ENCOUNTER — NURSE TRIAGE (OUTPATIENT)
Age: 64
End: 2024-01-30

## 2024-01-30 DIAGNOSIS — K52.9 INFLAMMATORY BOWEL DISEASE: ICD-10-CM

## 2024-01-30 RX ORDER — PREDNISONE 10 MG/1
TABLET ORAL DAILY
Qty: 102 TABLET | Refills: 0 | Status: SHIPPED | OUTPATIENT
Start: 2024-01-30 | End: 2024-01-31

## 2024-01-30 NOTE — TELEPHONE ENCOUNTER
"Reason for Disposition   Pharmacy calling with prescription question and triager unable to answer question    Answer Assessment - Initial Assessment Questions  1. NAME of MEDICATION: \"What medicine are you calling about?\"         Pharmacy calling to clarify tapering dose for prednisone, pharmacy stating there are 2 separate tapering doses and they are unsure which schedule the patient should be following, please clarify and call pharmacy with correct dose    Protocols used: Medication Question Call-ADULT-[place]    "

## 2024-01-31 ENCOUNTER — TELEPHONE (OUTPATIENT)
Dept: GASTROENTEROLOGY | Facility: CLINIC | Age: 64
End: 2024-01-31

## 2024-01-31 DIAGNOSIS — K51.211 ULCERATIVE PROCTITIS WITH RECTAL BLEEDING (HCC): Primary | ICD-10-CM

## 2024-01-31 RX ORDER — PREDNISONE 10 MG/1
TABLET ORAL DAILY
Qty: 102 TABLET | Refills: 0 | Status: SHIPPED | OUTPATIENT
Start: 2024-01-31 | End: 2024-03-13

## 2024-01-31 NOTE — TELEPHONE ENCOUNTER
----- Message from Magda Espitia MA sent at 1/31/2024  7:45 AM EST -----  Regarding: FW: humira    ----- Message -----  From: Eris Villeda MD  Sent: 1/30/2024   3:20 PM EST  To: [facility]  Subject: humira                                           Can we try to obtain humira for this patient? Thank you

## 2024-02-05 ENCOUNTER — OFFICE VISIT (OUTPATIENT)
Dept: FAMILY MEDICINE CLINIC | Facility: CLINIC | Age: 64
End: 2024-02-05
Payer: COMMERCIAL

## 2024-02-05 VITALS
TEMPERATURE: 97.6 F | RESPIRATION RATE: 18 BRPM | BODY MASS INDEX: 28.2 KG/M2 | WEIGHT: 197 LBS | HEART RATE: 70 BPM | HEIGHT: 70 IN | OXYGEN SATURATION: 97 % | DIASTOLIC BLOOD PRESSURE: 88 MMHG | SYSTOLIC BLOOD PRESSURE: 136 MMHG

## 2024-02-05 DIAGNOSIS — F41.0 PANIC ATTACK: ICD-10-CM

## 2024-02-05 DIAGNOSIS — K52.9 INFLAMMATORY BOWEL DISEASE: ICD-10-CM

## 2024-02-05 DIAGNOSIS — E78.2 MIXED HYPERLIPIDEMIA: ICD-10-CM

## 2024-02-05 DIAGNOSIS — F41.9 ANXIETY: ICD-10-CM

## 2024-02-05 DIAGNOSIS — I10 BENIGN ESSENTIAL HYPERTENSION: Primary | ICD-10-CM

## 2024-02-05 DIAGNOSIS — R73.09 ABNORMAL GLUCOSE: ICD-10-CM

## 2024-02-05 DIAGNOSIS — Z12.5 SCREENING PSA (PROSTATE SPECIFIC ANTIGEN): ICD-10-CM

## 2024-02-05 PROCEDURE — 99214 OFFICE O/P EST MOD 30 MIN: CPT | Performed by: FAMILY MEDICINE

## 2024-02-05 RX ORDER — CLONAZEPAM 0.5 MG/1
0.5 TABLET, ORALLY DISINTEGRATING ORAL DAILY PRN
Qty: 30 TABLET | Refills: 3 | Status: SHIPPED | OUTPATIENT
Start: 2024-02-05

## 2024-02-05 NOTE — PROGRESS NOTES
Name: Jj Jones      : 1960      MRN: [id number]  Encounter Provider: Yvrose Ramirez DO  Encounter Date: 2024   Encounter department: [facility]    Assessment & Plan     Assessment & Plan  1. Inflammatory bowel disease.  I discussed with him that prednisone long term is not good for him. I discussed with him about the side effects of prednisone. I recommended him to get a second opinion from Carmen Calvo. I recommended him to have more whole foods and stay away from processed foods. I recommended him to eat fresh fruits and vegetables.    2. Hypertension.  His diastolic blood pressure is still slightly elevated. He is doing okay without medications right now.    Anxiety- stable  Continue clonazepam prn      Follow-up  The patient will follow up in 6 months.     Orders Placed This Encounter   Procedures    CBC    Comprehensive metabolic panel    Lipid Panel with Direct LDL reflex    PSA Total (Reflex To Free)           Subjective      History of Present Illness  The patient presents for evaluation of multiple medical concerns.    His blood pressure has been under control.    He saw Dr. Villeda who did blood work, but did not do a full blood work for his cholesterol. He would rather do a low-dose prednisone to keep his pain at bay. He is back to his chiropractor a couple of days a week. He has an appointment with an acupuncturist tomorrow. He met with Dr. Villeda 3 times, who was not the one who did his colonoscopy. He was put on mesalamine and a suppository, which did not work. Dr. Villeda wanted to put him on Humira, which he declined. His symptoms started about a month after he got his second COVID-19 vaccine. He had problems with bleeding once, but now he is on day 6 of the prednisone. His stomach still gurgles a lot. He is drinking a lot of digestive teas with licorice in them. He just started kavitha and marshmallow root.    He had a lot of pasta and chocolate cake the day  "before his blood work. His fasting blood sugar was 83 before. He has been eating a lot of rice. He thinks pasta might be part of the trigger. He just started buying gluten-free food a couple of days ago. He is on prednisone.    Supplemental Information  He needs refills on clonazepam.   He received his second COVID-19 vaccine.  Review of Systems    Current Outpatient Medications on File Prior to Visit   Medication Sig    cholecalciferol (VITAMIN D3) 1,000 units tablet Take 1,000 Units by mouth daily    COLOSTRUM PO Take by mouth    Digestive Enzymes (DIGESTIVE ENZYME PO) Take by mouth in the morning    ketoconazole (NIZORAL) 2 % cream Apply topically daily    mesalamine (LIALDA) 1.2 g EC tablet Take 4 tablets (4.8 g total) by mouth daily with breakfast    multivitamin (THERAGRAN) TABS Take 1 tablet by mouth daily Oral liquid    predniSONE 10 mg tablet Take 4 tablets (40 mg total) by mouth daily for 14 days, THEN 3 tablets (30 mg total) daily for 7 days, THEN 2 tablets (20 mg total) daily for 7 days, THEN 1 tablet (10 mg total) daily for 7 days, THEN 0.5 tablets (5 mg total) daily for 7 days.    VITAMIN E BLEND PO Take by mouth    [DISCONTINUED] clonazePAM (KlonoPIN) 0.5 MG disintegrating tablet Take 1 tablet (0.5 mg total) by mouth daily as needed for anxiety    hydrocortisone (ANUSOL-HC) 25 mg suppository Insert 1 suppository (25 mg total) into the rectum 2 (two) times a day (Patient not taking: Reported on 6/27/2023)    mesalamine (ROWASA) 4 g Insert 60 mL (4 g total) into the rectum daily at bedtime (Patient not taking: Reported on 11/9/2023)       Objective     /88   Pulse 70   Temp 97.6 °F (36.4 °C)   Resp 18   Ht 5' 10\" (1.778 m)   Wt 89.4 kg (197 lb)   SpO2 97%   BMI 28.27 kg/m²   Physical Exam  Vitals and nursing note reviewed.   Constitutional:       Appearance: He is well-developed.   HENT:      Head: Normocephalic and atraumatic.      Right Ear: Tympanic membrane and external ear normal.      " Left Ear: Tympanic membrane and external ear normal.   Cardiovascular:      Rate and Rhythm: Normal rate and regular rhythm.      Heart sounds: Normal heart sounds. No murmur heard.  Pulmonary:      Effort: Pulmonary effort is normal. No respiratory distress.      Breath sounds: Normal breath sounds. No wheezing or rales.   Musculoskeletal:      Right lower leg: No edema.      Left lower leg: No edema.         Physical Exam  Vital Signs  Blood pressure is 136/88.  Yvrose Ramirez DO

## 2024-02-21 DIAGNOSIS — K51.211 ULCERATIVE PROCTITIS WITH RECTAL BLEEDING (HCC): Primary | ICD-10-CM

## 2024-02-21 RX ORDER — ADALIMUMAB 80MG/0.8ML
KIT SUBCUTANEOUS
Qty: 3 EACH | Refills: 0 | Status: SHIPPED | OUTPATIENT
Start: 2024-02-21

## 2024-03-21 ENCOUNTER — OFFICE VISIT (OUTPATIENT)
Dept: FAMILY MEDICINE CLINIC | Facility: CLINIC | Age: 64
End: 2024-03-21
Payer: COMMERCIAL

## 2024-03-21 VITALS
BODY MASS INDEX: 27.06 KG/M2 | SYSTOLIC BLOOD PRESSURE: 142 MMHG | HEIGHT: 70 IN | TEMPERATURE: 97.3 F | HEART RATE: 81 BPM | RESPIRATION RATE: 18 BRPM | DIASTOLIC BLOOD PRESSURE: 80 MMHG | WEIGHT: 189 LBS

## 2024-03-21 DIAGNOSIS — J06.9 ACUTE URI: Primary | ICD-10-CM

## 2024-03-21 DIAGNOSIS — K51.211 ULCERATIVE PROCTITIS WITH RECTAL BLEEDING (HCC): ICD-10-CM

## 2024-03-21 DIAGNOSIS — R19.03 RIGHT LOWER QUADRANT ABDOMINAL MASS: ICD-10-CM

## 2024-03-21 PROCEDURE — 99214 OFFICE O/P EST MOD 30 MIN: CPT | Performed by: NURSE PRACTITIONER

## 2024-03-21 RX ORDER — AZITHROMYCIN 250 MG/1
TABLET, FILM COATED ORAL
Qty: 6 TABLET | Refills: 0 | Status: SHIPPED | OUTPATIENT
Start: 2024-03-21 | End: 2024-03-26

## 2024-03-21 NOTE — PATIENT INSTRUCTIONS
Azithromycin (By mouth)   Azithromycin ([id number])  Treats infections. This medicine is a macrolide antibiotic.   Brand Name(s): Zithromax, Zithromax Tri-Isaac, Zithromax Z-Isaac, Zmax   There may be other brand names for this medicine.  When This Medicine Should Not Be Used:   This medicine is not right for everyone. Do not use it if you had an allergic reaction to azithromycin, erythromycin, or similar medicines, or if you have a history of liver problems caused by azithromycin.  How to Use This Medicine:   Capsule, Liquid, Packet, Powder, Tablet  Your doctor will tell you how much medicine to use. Do not use more than directed.  Take all of the medicine in your prescription to clear up your infection, even if you feel better after the first few doses.  Multiple dose (Zithromax® oral liquid or tablets):   You may take this medicine with or without food.  Shake the bottle well before you measure the medicine. Measure the oral liquid medicine with a marked measuring spoon, oral syringe, or medicine cup.  Single dose (Zmax® extended-release oral liquid or powder):   Liquid:   Take this medicine on an empty stomach at least 1 hour before you eat, or 2 hours after you eat.  Call your doctor right away if you vomit within 1 hour after you take the medicine.  You must take the liquid within 12 hours after the pharmacist gives it to you.  Shake the bottle well before you measure the medicine. Measure your dose with a marked measuring spoon, cup, or syringe.  Powder:   Open 1 packet and pour all of the medicine into a glass with about 2 ounces (¼ cup) of water. Mix well and drink the medicine right away. Pour another 2 ounces of water into the same glass, and drink the remaining medicine.  Read and follow the patient instructions that come with this medicine. Talk to your doctor or pharmacist if you have any questions.  Missed dose: If you are taking multiple doses, take the dose as soon as you remember. If it is  almost time for your next dose, wait until then to take a regular dose. Do not use extra medicine to make up for a missed dose.  Store the medicine in a closed container at room temperature, away from heat, moisture, and direct light.  Extended-release oral liquid: Do not refrigerate or freeze.  Oral liquid for 1 dose only: Store at room temperature. Do not store in the refrigerator or allow the medicine to freeze.  Oral liquid for multiple doses: Store at room temperature or in the refrigerator. Use it within 10 days of filling the prescription.  Drugs and Foods to Avoid:   Ask your doctor or pharmacist before using any other medicine, including over-the-counter medicines, vitamins, and herbal products.  Some medicines can affect how azithromycin works. Tell your doctor if you are also using any of the following:  Colchicine, cyclosporine, digoxin, nelfinavir, phenytoin  Blood thinner (including warfarin)  Ergot medicine  Medicine for a heart rhythm problem (including amiodarone, dofetilide, procainamide, quinidine, sotalol)  Zithromax® for multiple doses: Do not take an antacid that contains magnesium or aluminum at the same time you take Zithromax®. An antacid will affect how the medicine works. Antacids will not affect Zmax® for single dose.  Warnings While Using This Medicine:   Tell your doctor if you are pregnant or breastfeeding, or if you have kidney disease, liver disease, heart disease, heart rhythm problems, heart failure, or myasthenia gravis. Tell your doctor if anyone in your family has heart rhythm problems.  This medicine may cause the following problems:   Serious skin reactions, including Parisi-Arnol syndrome, acute generalized exanthematous pustulosis, toxic epidermal necrolysis, and drug reaction with eosinophilia and systemic symptoms (DRESS)  Liver problems  Infantile hypertrophic pyloric stenosis  Heart rhythm problems, including QT prolongation  Increased risk of serious heart or blood  vessel problems  This medicine can cause diarrhea. Call your doctor if the diarrhea becomes severe, does not stop, or is bloody. Do not take any medicine to stop diarrhea until you have talked to your doctor. Diarrhea can occur 2 months or more after you stop taking this medicine. It may occur 2 months or more after you stop using this medicine.  Call your doctor if your symptoms do not improve or if they get worse.  Your doctor will do lab tests at regular visits to check on the effects of this medicine. Keep all appointments.  Keep all medicine out of the reach of children. Never share your medicine with anyone.  Possible Side Effects While Using This Medicine:   Call your doctor right away if you notice any of these side effects:  Allergic reaction: Itching or hives, swelling in your face or hands, swelling or tingling in your mouth or throat, chest tightness, trouble breathing  Blistering, peeling, red skin rash  Dark urine, pale stools, nausea, vomiting, loss of appetite, stomach pain, yellow skin or eyes  Fainting, dizziness, lightheadedness  Fast, pounding, or uneven heartbeat, blurred vision, chest pain, trouble breathing, tiredness or weakness  Feeling irritable or vomits after feeding (in babies)  Severe diarrhea that may contain blood, stomach cramps, fever  If you notice these less serious side effects, talk with your doctor:   Mild diarrhea, nausea, vomiting, stomach pain  If you notice other side effects that you think are caused by this medicine, tell your doctor.   Call your doctor for medical advice about side effects. You may report side effects to FDA at 6-131-FDA-3461  © Copyright [employer] 2023 Information is for End User's use only and may not be sold, redistributed or otherwise used for commercial purposes.  The above information is an  only. It is not intended as medical advice for individual conditions or treatments. Talk to your doctor, nurse or pharmacist before following any  medical regimen to see if it is safe and effective for you.

## 2024-03-21 NOTE — PROGRESS NOTES
"Assessment/Plan:    1. Acute URI  -     azithromycin (ZITHROMAX) 250 mg tablet; Take 500mg on day 1, 250mg on days 2-5    2. Right lower quadrant abdominal mass  -     US superficial lump (non extremity); Future; Expected date: 03/21/2024    3. Ulcerative proctitis with rectal bleeding (HCC)  Assessment & Plan:  Follows with gastro and currently under care of acupuncture and stated that helping            Patient Instructions:  Take medication with food.  It is important that you take the entire course of antibiotics prescribed.  May also take a probiotic of your choice to maintain healthy GI stephen.    Can take some probiotic and yogurt with the medication.  Increase fluid intake, saline nasal rinses, and hot tea with honey and lemon.   Cool air humidification can be helpful as well.   May take Tylenol as needed for pain or fevers.    Mucinex D for sinus congestion or Coricidin HBP if you have high blood pressure or a heart condition.    Mucinex or Robitussin DM are effective for cough and chest congestion.    Supportive care discussed and advised.  Advised to RTO for any worsening and no improvement.   Follow up for no improvement and worsening of conditions.  Patient advised and educated when to see immediate medical care.    Return if symptoms worsen or fail to improve.      No future appointments.        Subjective:      Patient ID: Jj Jones is a 63 y.o. male.    Chief Complaint   Patient presents with   • Mass     Lower right abdomin- acupuncture person felt something and wanted him to get an US lw cma   • Ear Fullness     Ears feel full and congestion lw cma         Vitals:  /80   Pulse 81   Temp (!) 97.3 °F (36.3 °C) (Tympanic)   Resp 18   Ht 5' 10\" (1.778 m)   Wt 85.7 kg (189 lb)   BMI 27.12 kg/m²     Patient stated that started with sore throat, ear pain and congestion couple of days ago. Denies fever, chills and sob.  Stated that goes for acupuncture and he felt lump on right lower " quadrant area and denies any abdominal pain, nausea, vomiting and change in bowel habits. Would like to be checked for hernia    Ear Fullness   Associated symptoms include a sore throat. Pertinent negatives include no abdominal pain, coughing, diarrhea, ear discharge, headaches, hearing loss, rhinorrhea or vomiting.               The following portions of the patient's history were reviewed and updated as appropriate: allergies, current medications, past family history, past medical history, past social history, past surgical history and problem list.      Review of Systems   Constitutional:  Negative for chills, diaphoresis, fatigue, fever and unexpected weight change.   HENT:  Positive for congestion, ear pain and sore throat. Negative for dental problem, drooling, ear discharge, facial swelling, hearing loss, mouth sores, nosebleeds, postnasal drip, rhinorrhea, sinus pressure, sinus pain, sneezing, tinnitus, trouble swallowing and voice change.    Respiratory:  Negative for cough, chest tightness, shortness of breath and wheezing.    Cardiovascular: Negative.    Gastrointestinal:  Negative for abdominal pain, constipation, diarrhea, nausea and vomiting.        As noted in HPI       Musculoskeletal: Negative.    Skin: Negative.    Neurological:  Negative for dizziness, light-headedness and headaches.         Objective:    Social History     Tobacco Use   Smoking Status Former   • Current packs/day: 0.00   • Average packs/day: 1 pack/day for 31.5 years (31.5 ttl pk-yrs)   • Types: Cigarettes   • Start date: 1982   • Quit date: 7/8/2013   • Years since quitting: 10.7   • Passive exposure: Past   Smokeless Tobacco Never       Allergies: No Known Allergies      Current Outpatient Medications   Medication Sig Dispense Refill   • azithromycin (ZITHROMAX) 250 mg tablet Take 500mg on day 1, 250mg on days 2-5 6 tablet 0   • cholecalciferol (VITAMIN D3) 1,000 units tablet Take 1,000 Units by mouth daily     • clonazePAM  (KlonoPIN) 0.5 MG disintegrating tablet Take 1 tablet (0.5 mg total) by mouth daily as needed for anxiety 30 tablet 3   • COLOSTRUM PO Take by mouth     • Digestive Enzymes (DIGESTIVE ENZYME PO) Take by mouth in the morning     • ketoconazole (NIZORAL) 2 % cream Apply topically daily 60 g 0   • multivitamin (THERAGRAN) TABS Take 1 tablet by mouth daily Oral liquid     • VITAMIN E BLEND PO Take by mouth     • Adalimumab (Humira-CD/UC/HS Starter) 80 MG/0.8ML PNKT Inject under skin: 160mg on day 1 and 80mg on day 15 3 each 0   • hydrocortisone (ANUSOL-HC) 25 mg suppository Insert 1 suppository (25 mg total) into the rectum 2 (two) times a day (Patient not taking: Reported on 6/27/2023) 24 suppository 1   • mesalamine (LIALDA) 1.2 g EC tablet Take 4 tablets (4.8 g total) by mouth daily with breakfast (Patient not taking: Reported on 3/21/2024) 360 tablet 1   • mesalamine (ROWASA) 4 g Insert 60 mL (4 g total) into the rectum daily at bedtime (Patient not taking: Reported on 11/9/2023) 28 enema 2     No current facility-administered medications for this visit.          Physical Exam  Vitals reviewed.   Constitutional:       Appearance: Normal appearance. He is well-developed.   HENT:      Head: Normocephalic.      Right Ear: Tympanic membrane, ear canal and external ear normal.      Left Ear: Tympanic membrane, ear canal and external ear normal.      Nose: Nose normal.      Right Sinus: No maxillary sinus tenderness or frontal sinus tenderness.      Left Sinus: No maxillary sinus tenderness or frontal sinus tenderness.      Mouth/Throat:      Mouth: No oral lesions.      Pharynx: No oropharyngeal exudate or posterior oropharyngeal erythema.   Cardiovascular:      Rate and Rhythm: Normal rate and regular rhythm.      Heart sounds: Normal heart sounds.   Pulmonary:      Effort: Pulmonary effort is normal.      Breath sounds: Normal breath sounds.   Abdominal:      General: Bowel sounds are normal. There is no distension.       Palpations: Abdomen is soft.      Tenderness: There is no abdominal tenderness. There is no rebound.       Musculoskeletal:         General: Normal range of motion.      Cervical back: Neck supple.   Lymphadenopathy:      Cervical:      Right cervical: No superficial or posterior cervical adenopathy.     Left cervical: No superficial or posterior cervical adenopathy.   Skin:     General: Skin is warm and dry.   Neurological:      Mental Status: He is alert and oriented to person, place, and time.   Psychiatric:         Behavior: Behavior normal.         Thought Content: Thought content normal.         Judgment: Judgment normal.                     MEI Hutchins

## 2024-03-28 DIAGNOSIS — K51.211 ULCERATIVE PROCTITIS WITH RECTAL BLEEDING (HCC): Primary | ICD-10-CM

## 2024-03-28 RX ORDER — PREDNISONE 10 MG/1
TABLET ORAL
Qty: 40 TABLET | Refills: 0 | Status: SHIPPED | OUTPATIENT
Start: 2024-03-28

## 2024-04-03 ENCOUNTER — TELEPHONE (OUTPATIENT)
Dept: GASTROENTEROLOGY | Facility: CLINIC | Age: 64
End: 2024-04-03

## 2024-04-03 NOTE — TELEPHONE ENCOUNTER
Pt is due for a colonoscopy for hx of proctitis (Dr Champagne pt). I lmom for pt to please call back to schedule a colonoscopy with another one of GI providers as Dr Champagne has retired. Will call again if do not hear back from him.

## 2024-04-11 NOTE — TELEPHONE ENCOUNTER
I lmom asking pt to please call back to schedule a colonoscopy with one of our GI providers as Dr Champagne has retired.  Will call pt again if do not hear back from him.

## 2024-05-06 ENCOUNTER — TELEPHONE (OUTPATIENT)
Age: 64
End: 2024-05-06

## 2024-05-06 DIAGNOSIS — K51.211 ULCERATIVE PROCTITIS WITH RECTAL BLEEDING (HCC): ICD-10-CM

## 2024-05-06 RX ORDER — MESALAMINE 4 G/60ML
4 SUSPENSION RECTAL
Qty: 1680 ML | Refills: 0 | Status: SHIPPED | OUTPATIENT
Start: 2024-05-06 | End: 2024-06-03

## 2024-05-06 RX ORDER — MESALAMINE 4 G/60ML
4 SUSPENSION RECTAL
Qty: 28 ENEMA | Refills: 2 | Status: SHIPPED | OUTPATIENT
Start: 2024-05-06 | End: 2024-05-06 | Stop reason: SDUPTHER

## 2024-05-06 RX ORDER — PREDNISONE 10 MG/1
TABLET ORAL
Qty: 40 TABLET | Refills: 0 | Status: SHIPPED | OUTPATIENT
Start: 2024-05-06

## 2024-05-06 NOTE — TELEPHONE ENCOUNTER
Pharmacy called and they need quantity verification on the Rowasa Dr. Ramirez ordered.      [id number] - [place], [place] - [address] Phone: [phone]   Fax: [phone]

## 2024-06-11 ENCOUNTER — TELEPHONE (OUTPATIENT)
Dept: FAMILY MEDICINE CLINIC | Facility: CLINIC | Age: 64
End: 2024-06-11

## 2024-06-11 DIAGNOSIS — K51.211 ULCERATIVE PROCTITIS WITH RECTAL BLEEDING (HCC): ICD-10-CM

## 2024-06-11 RX ORDER — PREDNISONE 10 MG/1
TABLET ORAL
Qty: 40 TABLET | Refills: 0 | Status: SHIPPED | OUTPATIENT
Start: 2024-06-11

## 2024-06-11 NOTE — TELEPHONE ENCOUNTER
Left voice mail for patient to call back  Please notify patient of Dr Ramirez's note and schedule appt  Thank you  Tabby Perera MA

## 2024-06-25 DIAGNOSIS — K51.211 ULCERATIVE PROCTITIS WITH RECTAL BLEEDING (HCC): ICD-10-CM

## 2024-06-25 RX ORDER — PREDNISONE 10 MG/1
TABLET ORAL
Qty: 40 TABLET | Refills: 0 | Status: SHIPPED | OUTPATIENT
Start: 2024-06-25

## 2024-07-01 ENCOUNTER — HOSPITAL ENCOUNTER (OUTPATIENT)
Dept: RADIOLOGY | Facility: HOSPITAL | Age: 64
Discharge: HOME | End: 2024-07-01
Payer: COMMERCIAL

## 2024-07-01 DIAGNOSIS — K51.211 ULCERATIVE PROCTITIS WITH RECTAL BLEEDING (HCC): Primary | ICD-10-CM

## 2024-07-01 DIAGNOSIS — R19.03 RIGHT LOWER QUADRANT ABDOMINAL MASS: ICD-10-CM

## 2024-07-01 PROCEDURE — 76705 ECHO EXAM OF ABDOMEN: CPT

## 2024-07-01 RX ORDER — BALSALAZIDE DISODIUM 750 MG/1
2250 CAPSULE ORAL 3 TIMES DAILY
Qty: 270 CAPSULE | Refills: 2 | Status: SHIPPED | OUTPATIENT
Start: 2024-07-01 | End: 2024-09-23

## 2024-07-05 DIAGNOSIS — F41.9 ANXIETY: ICD-10-CM

## 2024-07-05 DIAGNOSIS — F41.0 PANIC ATTACK: ICD-10-CM

## 2024-07-05 RX ORDER — CLONAZEPAM 0.5 MG/1
0.5 TABLET, ORALLY DISINTEGRATING ORAL DAILY PRN
Qty: 30 TABLET | Refills: 0 | Status: CANCELLED | OUTPATIENT
Start: 2024-07-05

## 2024-07-08 DIAGNOSIS — F41.0 PANIC ATTACK: ICD-10-CM

## 2024-07-08 DIAGNOSIS — F41.9 ANXIETY: ICD-10-CM

## 2024-07-08 RX ORDER — CLONAZEPAM 0.5 MG/1
0.5 TABLET, ORALLY DISINTEGRATING ORAL DAILY PRN
Qty: 30 TABLET | Refills: 3 | Status: SHIPPED | OUTPATIENT
Start: 2024-07-08

## 2024-07-13 DIAGNOSIS — K51.211 ULCERATIVE PROCTITIS WITH RECTAL BLEEDING (HCC): ICD-10-CM

## 2024-07-15 RX ORDER — PREDNISONE 10 MG/1
TABLET ORAL
Qty: 40 TABLET | Refills: 0 | Status: SHIPPED | OUTPATIENT
Start: 2024-07-15

## 2024-08-01 DIAGNOSIS — K51.211 ULCERATIVE PROCTITIS WITH RECTAL BLEEDING (HCC): ICD-10-CM

## 2024-08-01 RX ORDER — PREDNISONE 10 MG/1
TABLET ORAL
Qty: 60 TABLET | Refills: 0 | Status: SHIPPED | OUTPATIENT
Start: 2024-08-01

## 2024-08-06 NOTE — TELEPHONE ENCOUNTER
Jj Jones   to  Primary [facility] (supporting Yvrose Ramirez DO)         7/6/24 11:08 AM  Hi Dr Ramirez,      I submitted a refill request for clonazapam but I guess you need to process it because it said it was ineligible to do through the request process. Please send in a refill for clonazapam.      Thank you,  Jj   
The [place] Prescription Monitoring report was reviewed and is appropriate.   
06-Aug-2024 19:39

## 2024-08-21 ENCOUNTER — TELEPHONE (OUTPATIENT)
Dept: FAMILY MEDICINE CLINIC | Facility: CLINIC | Age: 64
End: 2024-08-21

## 2024-08-21 NOTE — TELEPHONE ENCOUNTER
Appointment Request From: Jj Jones      With Provider: Yvrose Ramirez DO [-Primary Care Physician-]      Preferred Date Range: Any date 8/21/2024 or later      Preferred Times: Any      Reason: To address the following health maintenance concerns.   Colorectal Cancer Screening      Comments:   Luis Ramirez, I believe I was due for a colonoscopy, can you please put in an order for one. I have my physical with you on 9/16 and was going to schedule one with the gastroenterologist. I figured I would get this done ahead of time if possible. Last time it wasn’t put in the system as a routine /annual screening and my copay was $1500.00. If you can have it entered as annual or routine screening I would appreciate it.      Thank you,   Jj     Appointment Request ()  (Newest Message First)  Jj Jones  [facility] Clerical1 hour ago (7:49 AM)       Appointment Request From: Jj Jones     With Provider: Yvrose Ramirez DO [-Primary Care Physician-]     Preferred Date Range: Any date 8/21/2024 or later     Preferred Times: Any     Reason: To address the following health maintenance concerns.  Colorectal Cancer Screening     Comments:  Luis Ramirez, I believe I was due for a colonoscopy, can you please put in an order for one. I have my physical with you on 9/16 and was going to schedule one with the gastroenterologist. I figured I would get this done ahead of time if possible. Last time it wasn’t put in the system as a routine /annual screening and my copay was $1500.00. If you can have it entered as annual or routine screening I would appreciate it.     Thank you,  Jj

## 2024-08-25 DIAGNOSIS — Z12.11 SCREENING FOR COLON CANCER: Primary | ICD-10-CM

## 2024-08-29 DIAGNOSIS — K51.211 ULCERATIVE PROCTITIS WITH RECTAL BLEEDING (HCC): ICD-10-CM

## 2024-08-29 RX ORDER — PREDNISONE 10 MG/1
TABLET ORAL
Qty: 60 TABLET | Refills: 0 | Status: SHIPPED | OUTPATIENT
Start: 2024-08-29

## 2024-09-16 ENCOUNTER — OFFICE VISIT (OUTPATIENT)
Dept: FAMILY MEDICINE CLINIC | Facility: CLINIC | Age: 64
End: 2024-09-16
Payer: COMMERCIAL

## 2024-09-16 VITALS
WEIGHT: 170 LBS | DIASTOLIC BLOOD PRESSURE: 80 MMHG | TEMPERATURE: 98 F | HEART RATE: 76 BPM | RESPIRATION RATE: 16 BRPM | HEIGHT: 70 IN | SYSTOLIC BLOOD PRESSURE: 138 MMHG | BODY MASS INDEX: 24.34 KG/M2

## 2024-09-16 DIAGNOSIS — Z12.5 SCREENING PSA (PROSTATE SPECIFIC ANTIGEN): ICD-10-CM

## 2024-09-16 DIAGNOSIS — Z13.6 SCREENING FOR CARDIOVASCULAR CONDITION: ICD-10-CM

## 2024-09-16 DIAGNOSIS — Z12.11 SCREENING FOR COLON CANCER: ICD-10-CM

## 2024-09-16 DIAGNOSIS — Z00.00 ANNUAL PHYSICAL EXAM: Primary | ICD-10-CM

## 2024-09-16 DIAGNOSIS — F17.211 NICOTINE DEPENDENCE, CIGARETTES, IN REMISSION: ICD-10-CM

## 2024-09-16 DIAGNOSIS — K51.211 ULCERATIVE PROCTITIS WITH RECTAL BLEEDING (HCC): ICD-10-CM

## 2024-09-16 PROCEDURE — 99396 PREV VISIT EST AGE 40-64: CPT | Performed by: FAMILY MEDICINE

## 2024-09-16 NOTE — PROGRESS NOTES
Adult Annual Physical  Name: Jj Jones      : 1960      MRN: [id number]  Encounter Provider: Yvrose Ramirez DO  Encounter Date: 2024   Encounter department: [facility]    Assessment & Plan  Annual physical exam         Nicotine dependence, cigarettes, in remission  Quit smoking 16 years ago        Screening for colon cancer  Awaiting for call from GI for colonoscopy        Ulcerative proctitis with rectal bleeding (HCC)  Improving symptoms with supplements and diet   Orders:    CBC; Future    CBC    Screening PSA (prostate specific antigen)    Orders:    PSA Total (Reflex To Free); Future    PSA Total (Reflex To Free)    Screening for cardiovascular condition    Orders:    Comprehensive metabolic panel; Future    Lipid Panel with Direct LDL reflex; Future    Comprehensive metabolic panel    Lipid Panel with Direct LDL reflex      Immunizations and preventive care screenings were discussed with patient today. Appropriate education was printed on patient's after visit summary.        Counseling:  Dental Health: discussed importance of regular tooth brushing, flossing, and dental visits.  Exercise: the importance of regular exercise/physical activity was discussed. Recommend exercise 3-5 times per week for at least 30 minutes.   Declined flu shot    Return in about 1 year (around 2025) for Annual physical.       History of Present Illness     Adult Annual Physical:  Patient presents for annual physical. He has not needed to use the last dose of prednisone.  He is taking digestive enzymes and probiotics.     His bowel movements have been normal and he has not seen any blood in his stools. .     Diet and Physical Activity:  - Diet/Nutrition: well balanced diet.  - Exercise: moderate cardiovascular exercise.    Depression Screening:  - PHQ-2 Score: 0    General Health:  - Sleep: sleeps well.  - Hearing: decreased hearing bilateral ears.  - Vision: wears glasses.  - Dental: no dental  "visits for > 1 year.    Review of Systems   Respiratory: Negative.     Cardiovascular: Negative.    Gastrointestinal:  Negative for blood in stool and diarrhea.         Objective     /80   Pulse 76   Temp 98 °F (36.7 °C)   Resp 16   Ht 5' 10\" (1.778 m)   Wt 77.1 kg (170 lb)   BMI 24.39 kg/m²     Physical Exam  Vitals reviewed.   Constitutional:       Appearance: He is well-developed.   HENT:      Head: Normocephalic and atraumatic.      Right Ear: External ear normal.      Left Ear: External ear normal.   Cardiovascular:      Rate and Rhythm: Normal rate and regular rhythm.      Heart sounds: Normal heart sounds. No murmur heard.  Pulmonary:      Effort: Pulmonary effort is normal. No respiratory distress.      Breath sounds: Normal breath sounds. No wheezing.   Abdominal:      Palpations: Abdomen is soft.      Tenderness: There is no abdominal tenderness.   Musculoskeletal:         General: Normal range of motion.   Skin:     General: Skin is warm and dry.   Neurological:      Mental Status: He is alert and oriented to person, place, and time.        Vision Screening    Right eye Left eye Both eyes   Without correction      With correction 20/20 20/15 20/15       "

## 2024-09-16 NOTE — PATIENT INSTRUCTIONS
"Patient Education     Routine physical for adults   The Basics   Written by the doctors and editors at [facility]   What is a physical? -- A physical is a routine visit, or \"check-up,\" with your doctor. You might also hear it called a \"wellness visit\" or \"preventive visit.\"  During each visit, the doctor will:   Ask about your physical and mental health   Ask about your habits, behaviors, and lifestyle   Do an exam   Give you vaccines if needed   Talk to you about any medicines you take   Give advice about your health   Answer your questions  Getting regular check-ups is an important part of taking care of your health. It can help your doctor find and treat any problems you have. But it's also important for preventing health problems.  A routine physical is different from a \"sick visit.\" A sick visit is when you see a doctor because of a health concern or problem. Since physicals are scheduled ahead of time, you can think about what you want to ask the doctor.  How often should I get a physical? -- It depends on your age and health. In general, for people age 21 years and older:   If you are younger than 50 years, you might be able to get a physical every 3 years.   If you are 50 years or older, your doctor might recommend a physical every year.  If you have an ongoing health condition, like diabetes or high blood pressure, your doctor will probably want to see you more often.  What happens during a physical? -- In general, each visit will include:   Physical exam - The doctor or nurse will check your height, weight, heart rate, and blood pressure. They will also look at your eyes and ears. They will ask about how you are feeling and whether you have any symptoms that bother you.   Medicines - It's a good idea to bring a list of all the medicines you take to each doctor visit. Your doctor will talk to you about your medicines and answer any questions. Tell them if you are having any side effects that bother you. You " "should also tell them if you are having trouble paying for any of your medicines.   Habits and behaviors - This includes:   Your diet   Your exercise habits   Whether you smoke, drink alcohol, or use drugs   Whether you are sexually active   Whether you feel safe at home  Your doctor will talk to you about things you can do to improve your health and lower your risk of health problems. They will also offer help and support. For example, if you want to quit smoking, they can give you advice and might prescribe medicines. If you want to improve your diet or get more physical activity, they can help you with this, too.   Lab tests, if needed - The tests you get will depend on your age and situation. For example, your doctor might want to check your:   Cholesterol   Blood sugar   Iron level   Vaccines - The recommended vaccines will depend on your age, health, and what vaccines you already had. Vaccines are very important because they can prevent certain serious or deadly infections.   Discussion of screening - \"Screening\" means checking for diseases or other health problems before they cause symptoms. Your doctor can recommend screening based on your age, risk, and preferences. This might include tests to check for:   Cancer, such as breast, prostate, cervical, ovarian, colorectal, prostate, lung, or skin cancer   Sexually transmitted infections, such as chlamydia and gonorrhea   Mental health conditions like depression and anxiety  Your doctor will talk to you about the different types of screening tests. They can help you decide which screenings to have. They can also explain what the results might mean.   Answering questions - The physical is a good time to ask the doctor or nurse questions about your health. If needed, they can refer you to other doctors or specialists, too.  Adults older than 65 years often need other care, too. As you get older, your doctor will talk to you about:   How to prevent falling at " home   Hearing or vision tests   Memory testing   How to take your medicines safely   Making sure that you have the help and support you need at home  All topics are updated as new evidence becomes available and our peer review process is complete.  This topic retrieved from [employer] on: May 02, 2024.  Topic 736651 Version 1.0  Release: 32.4.3 - C32.122  © 2024 UpToDate, Inc. and/or its affiliates. All rights reserved.  Consumer Information Use and Disclaimer   Disclaimer: This generalized information is a limited summary of diagnosis, treatment, and/or medication information. It is not meant to be comprehensive and should be used as a tool to help the user understand and/or assess potential diagnostic and treatment options. It does NOT include all information about conditions, treatments, medications, side effects, or risks that may apply to a specific patient. It is not intended to be medical advice or a substitute for the medical advice, diagnosis, or treatment of a health care provider based on the health care provider's examination and assessment of a patient's specific and unique circumstances. Patients must speak with a health care provider for complete information about their health, medical questions, and treatment options, including any risks or benefits regarding use of medications. This information does not endorse any treatments or medications as safe, effective, or approved for treating a specific patient. UpToDate, Inc. and its affiliates disclaim any warranty or liability relating to this information or the use thereof.The use of this information is governed by the Terms of Use, available at https://www.[employer] Networku[employer].com/en/know/clinical-effectiveness-terms. 2024© UpToDate, Inc. and its affiliates and/or licensors. All rights reserved.  Copyright   © 2024 UpToDate, Inc. and/or its affiliates. All rights reserved.

## 2024-09-20 ENCOUNTER — PREP FOR PROCEDURE (OUTPATIENT)
Age: 64
End: 2024-09-20

## 2024-09-20 ENCOUNTER — TELEPHONE (OUTPATIENT)
Age: 64
End: 2024-09-20

## 2024-09-20 DIAGNOSIS — K51.211 ULCERATIVE PROCTITIS WITH RECTAL BLEEDING (HCC): Primary | ICD-10-CM

## 2024-09-20 NOTE — TELEPHONE ENCOUNTER
Scheduled date of colonoscopy (as of today): 11/11/24    Physician performing colonoscopy: Dr. Villeda    Location of colonoscopy: [facility]

## 2024-09-20 NOTE — LETTER
Valdemar Gardner,    Attached are your prep instructions for your upcoming Colonoscopy scheduled on 11/11/24 with Dr. Villeda. The GI lab will be calling the day before between 2pm and 6pm with your arrival time for your procedure. If you have any questions, please feel free to contact our office at [phone].    Address to our location:  [facility], [address] [place] [address], [id number]       Thank you for choosing [facility]al [facility]!                                                                    Medicine Instructions for Adults with Diabetes who Need a Bowel Prep       Follow these instructions when a BOWEL PREP is required for your procedure or surgery!    NOTE:   GLP-1 Agonists taken weekly: do not take in the 7 days before your procedure   SGLT-2 Inhibitors: do not take in the 4 days before your procedure     On the Day Before Surgery/Procedure  If you are having a procedure (e.g. Colonoscopy) or surgery that requires a bowel prep and you may have at least a clear liquid diet, follow the directions below based on the type of medicine you take for your diabetes.     Type of Medicine You Take Examples What to do   Pre-Mixed Insulin - Intermediate Acting Humalog® 75/25, Humulin® 70/30, Novolog® 70/30, Regular Insulin Take ½ your regular dose the evening before your procedure   Rapid/Fast Acting Insulin Humalog® U200, NovoLog®, Apidra®, Fiasp® Take ½ your regular dose the evening before your procedure.   Long-Acting Insulin Lantus®, Levemir®, Tresiba®, Toujeo®, Basaglar® Take your FULL regular dose the day before procedure   Oral Sulfonylurea Glipizide/Glimepiride/Glucotrol® Take ½ your regular dose the evening before your procedure   Other Oral Diabetes Medicines Metformin®, Glucophage®, Glucophage XR®, Riomet®, Glumetza®), Actose®, Avandia®, Glyset®, Prandin® Take your regular dose with dinner in the evening before your procedure    GLP-1 Agonists AdlyxinÒ, ByettaÒ, BydureonÒ, OzempicÒ, SoliquaÒ, TanzeumÒ, TrulicityÒ, VictozaÒ, Saxenda®, Rybelsus® If taken daily, take as normal    If taken weekly, do not take this medicine for 7 days before your procedure including the day of the procedure (resume taking after the procedure)   SGLT-2 Inhibitors Jardiance®, Invokana®, Farxiga®,   Steglatro®, Brenzavvy®, Qtern®, Segluromet®, Glyxambi®, Synjardy®, Synjardy XR®, Invokamet®, Invokamet XR®, Trijary XR®, Xigduo XR®, Steglujan® Do not take for 4 days before your procedure including the day of the procedure (resume taking after the procedure)                More information continued on back                    Medicine Instructions for Adults with Diabetes who Need a Bowel Prep  Page 2      On the Day of Surgery/Procedure  Follow the directions below based on the type of medicine you take for your diabetes.     Type of Medicine You Take Examples What to do   Long-Acting Insulin Lantus®, Levemir®, Tresiba®, Toujeo®, Basaglar®, Semglee®   If you usually take your Long-Acting Insulin in the morning, take the full dose as scheduled.   GLP-1 Agonists AdlyxinÒ, ByettaÒ, BydureonÒ, OzempicÒ, SoliquaÒ, TanzeumÒ, TrulicityÒ, VictozaÒ, Saxenda®, Rybelsus® Do NOT take this medicine on the day of your procedure (resume taking after the procedure)       On the Day of Surgery/Procedure (continued)  Except for the morning Long-Acting Insulin, DO NOT take ANY diabetic medicine on the day of your procedure unless you were instructed by the doctor who manages your diabetes medicines.    Continue to check your blood sugars.  If you have an insulin pump, ask your endocrinologist for instructions at least 3 days before your procedure. NOTE: If you are not able to ask your endocrinologist in advance, on the day of the procedure set your insulin pump to your basal rate only. Bring your insulin pump supplies to the hospital.     If you have any questions about taking your  diabetes medicines prior to your procedure, please contact the doctor who manages your diabetes medicines.                                                                            COLONOSCOPY  MIRALAX/Dulcolax Bowel Preparation Instructions    The OR/GI Lab will contact you the evening prior to your procedure with your exact arrival time.    Our practice requires a 1 week notice for any cancellations or rescheduling. We kindly ask that you immediately notify us of any changes including any new medications that are prescribed. Thank you for your cooperation.     WEEK BEFORE YOUR PROCEDURE:  Stop taking Iron tablets.  5 days prior, AVOID vegetables and fruits with skins or seeds, nuts, corn, popcorn and whole grain breads.   Purchase: One (1) 238-gram container of Miralax (polyethylene glycol 3350), four (4) 5 mg Dulcolax (bisacodyl) tablets, and one (1) 64-ounce bottle of Gatorade (sports drink) - no red, orange, or purple. These may be purchased at any pharmacy without a prescription. Generic products are permissible.   Arrange responsible transportation for day of the procedure.     DAY BEFORE THE PROCEDURE:   CLEAR liquids only for entire day prior. Nothing red, orange or purple.    You MAY have:                                                               Soda  Water  Broth Gatorade  Jello  Popsicles Coffee/tea without milk/creamer     YOU MAY NOT HAVE:  Solid foods   Milk and milk products    Juice with pulp    BOWEL PREPARATION:  Includes: One (1) 238-gram container of Miralax (polyethylene glycol 3350), four (4) 5 mg Dulcolax (bisacodyl) tablets, and one (1) 64-ounce bottle of Gatorade (sports drink).  Preparation may be refrigerated.  Entire bowel prep should be completed.     Afternoon before the procedure (2:00 pm - 5:00 pm):    Take two (2) 5 mg Dulcolax laxative tablets.     Evening before the procedure (6:00 pm):  Mix entire container of Miralax with one (1) 64-ounce bottle of Gatorade and shake until  all medication is dissolved.   Begin drinking solution. Drink an eight (8) ounce cup every 10-15 minutes until you have consumed half (32 ounces) of the solution.  Refrigerate remaining solution.    Night before the procedure (8:00 pm):  Take two (2) 5 mg Dulcolax laxative tablets.     Beginning 5 hours before your procedure:  Drink the remaining amount of prepared solution (32 ounces).  Drink an eight (8) ounce cup every 10-15 minutes until you have consumed the remaining solution.     Bowel prep should be completed 4 hours prior to procedure time.    NOTHING TO EAT OR DRINK AFTER MIDNIGHT- EXCEPT FOR YOUR PREP    DAY OF THE PROCEDURE:  You may brush your teeth.  Leave all jewelry at home.  Please arrive for your procedure as indicated by the OR / GI Lab / Endoscopy Unit. The hospital will contact you the day before with your exact arrival time.   Make sure you have arranged ahead of time for a responsible adult (18 or older) to accompany and drive you home after the procedure.  Please discuss any transportation concerns with our staff prior to your procedure.    The effects of the anesthesia can persist for 24 hours.  After receiving the sedation, you must exercise caution before engaging in any activity that could harm yourself and others (such as driving a car).  Do not make any important decisions or do not drink any alcoholic beverages during this time period.  After your procedure, you may have anything you'd like to eat or drink.  You will probably want to start with something light.  Please include plenty of fluids.  Avoid items that cause gas such as sodas and salads.    SPECIAL INSTRUCTIONS:    For patients currently taking blood thinners and/or antiplatelet therapy our office will contact the prescribing provider.  Our office will contact you with any required changes to your medication regimen.     Blood thinner (i.e. - Coumadin, Pradaxa, Lovenox, Xarelto, Eliquis)  ?  Continue (Do Not  Stop)  ? Stop______________for_____________days prior to the procedure.    Antiplatelet (i.e. - Plavix, Aggrenox, Effient, Brilinta)  ?  Continue (Do Not Stop)  ? Stop______________for_____________days prior to the procedure.       Diabetes:   If you are Diabetic, please see separate Diabetic Instruction Sheet.          Prescribed medications:  Do not stop your aspirin, or any of your other medications (unless instructed otherwise).    Take the rest of your prescribed medications with small sips of water at least 2 hours prior to your procedure.      For any questions or concerns related to your bowel preparation or pre-procedure instructions, please contact our office at [phone].  Thank you for choosing [facility]!

## 2024-09-20 NOTE — TELEPHONE ENCOUNTER
09/20/24  Screened by: Gela Patrick MA    Referring Provider Dr. Ramirez    Pre- Screening:     There is no height or weight on file to calculate BMI.  Has patient been referred for a routine screening Colonoscopy? yes  Is the patient between 45-75 years old? yes      Previous Colonoscopy yes   If yes:    Date: 3/2023    Facility:     Reason:         Does the patient want to see a Gastroenterologist prior to their procedure OR are they having any GI symptoms? no    Has the patient been hospitalized or had abdominal surgery in the past 6 months? no    Does the patient use supplemental oxygen? no    Does the patient take Coumadin, Lovenox, Plavix, Elliquis, Xarelto, or other blood thinning medication? no    Has the patient had a stroke, cardiac event, or stent placed in the past year? no      If patient is between 45yrs - 49yrs, please advise patient that we will have to confirm benefits & coverage with their insurance company for a routine screening colonoscopy.

## 2024-10-03 ENCOUNTER — TELEPHONE (OUTPATIENT)
Age: 64
End: 2024-10-03

## 2024-10-03 DIAGNOSIS — K51.211 ULCERATIVE PROCTITIS WITH RECTAL BLEEDING (HCC): ICD-10-CM

## 2024-10-03 LAB
ALBUMIN SERPL-MCNC: 4.1 G/DL (ref 3.9–4.9)
ALP SERPL-CCNC: 62 IU/L (ref 44–121)
ALT SERPL-CCNC: 9 IU/L (ref 0–44)
AST SERPL-CCNC: 11 IU/L (ref 0–40)
BILIRUB SERPL-MCNC: 0.3 MG/DL (ref 0–1.2)
BUN SERPL-MCNC: 18 MG/DL (ref 8–27)
BUN/CREAT SERPL: 21 (ref 10–24)
CALCIUM SERPL-MCNC: 9.1 MG/DL (ref 8.6–10.2)
CHLORIDE SERPL-SCNC: 102 MMOL/L (ref 96–106)
CHOLEST SERPL-MCNC: 196 MG/DL (ref 100–199)
CO2 SERPL-SCNC: 23 MMOL/L (ref 20–29)
CREAT SERPL-MCNC: 0.84 MG/DL (ref 0.76–1.27)
EGFR: 99 ML/MIN/1.73
ERYTHROCYTE [DISTWIDTH] IN BLOOD BY AUTOMATED COUNT: 12.5 % (ref 11.6–15.4)
GLOBULIN SER-MCNC: 2.2 G/DL (ref 1.5–4.5)
GLUCOSE SERPL-MCNC: 97 MG/DL (ref 70–99)
HCT VFR BLD AUTO: 40.6 % (ref 37.5–51)
HDLC SERPL-MCNC: 53 MG/DL
HGB BLD-MCNC: 13.4 G/DL (ref 13–17.7)
LDLC SERPL CALC-MCNC: 119 MG/DL (ref 0–99)
LDLC/HDLC SERPL: 2.2 RATIO (ref 0–3.6)
MCH RBC QN AUTO: 30 PG (ref 26.6–33)
MCHC RBC AUTO-ENTMCNC: 33 G/DL (ref 31.5–35.7)
MCV RBC AUTO: 91 FL (ref 79–97)
MICRODELETION SYND BLD/T FISH: NORMAL
PLATELET # BLD AUTO: 283 X10E3/UL (ref 150–450)
POTASSIUM SERPL-SCNC: 4.2 MMOL/L (ref 3.5–5.2)
PROT SERPL-MCNC: 6.3 G/DL (ref 6–8.5)
PSA SERPL-MCNC: 1.4 NG/ML (ref 0–4)
RBC # BLD AUTO: 4.46 X10E6/UL (ref 4.14–5.8)
SL AMB REFLEX CRITERIA: NORMAL
SL AMB VLDL CHOLESTEROL CALC: 24 MG/DL (ref 5–40)
SODIUM SERPL-SCNC: 142 MMOL/L (ref 134–144)
TRIGL SERPL-MCNC: 133 MG/DL (ref 0–149)
WBC # BLD AUTO: 8.4 X10E3/UL (ref 3.4–10.8)

## 2024-10-03 RX ORDER — PREDNISONE 10 MG/1
TABLET ORAL
Qty: 50 TABLET | Refills: 0 | Status: SHIPPED | OUTPATIENT
Start: 2024-10-03 | End: 2024-10-23

## 2024-10-03 NOTE — TELEPHONE ENCOUNTER
Eris Villeda MD   to [facility]       10/3/24  6:58 AM  Can we look into coverage of Entyv[employer]?  Thank you

## 2024-10-08 ENCOUNTER — APPOINTMENT (OUTPATIENT)
Dept: RADIOLOGY | Facility: CLINIC | Age: 64
End: 2024-10-08
Payer: COMMERCIAL

## 2024-10-08 ENCOUNTER — OFFICE VISIT (OUTPATIENT)
Dept: FAMILY MEDICINE CLINIC | Facility: CLINIC | Age: 64
End: 2024-10-08
Payer: COMMERCIAL

## 2024-10-08 VITALS
DIASTOLIC BLOOD PRESSURE: 78 MMHG | RESPIRATION RATE: 18 BRPM | WEIGHT: 162 LBS | SYSTOLIC BLOOD PRESSURE: 148 MMHG | BODY MASS INDEX: 23.24 KG/M2 | HEART RATE: 76 BPM | TEMPERATURE: 98.3 F

## 2024-10-08 DIAGNOSIS — R07.81 RIB PAIN: ICD-10-CM

## 2024-10-08 DIAGNOSIS — R07.89 CHEST WALL DISCOMFORT: ICD-10-CM

## 2024-10-08 DIAGNOSIS — R07.89 CHEST WALL DISCOMFORT: Primary | ICD-10-CM

## 2024-10-08 PROCEDURE — 99213 OFFICE O/P EST LOW 20 MIN: CPT | Performed by: NURSE PRACTITIONER

## 2024-10-08 PROCEDURE — 71111 X-RAY EXAM RIBS/CHEST4/> VWS: CPT

## 2024-10-08 RX ORDER — CYCLOBENZAPRINE HCL 10 MG
10 TABLET ORAL
Qty: 20 TABLET | Refills: 0 | Status: SHIPPED | OUTPATIENT
Start: 2024-10-08 | End: 2024-10-28

## 2024-10-08 RX ORDER — TRAMADOL HYDROCHLORIDE 50 MG/1
50 TABLET ORAL 2 TIMES DAILY PRN
Qty: 10 TABLET | Refills: 0 | Status: SHIPPED | OUTPATIENT
Start: 2024-10-08

## 2024-10-08 NOTE — PATIENT INSTRUCTIONS
Do not take muscle relaxant and tramadol at same time  Patient Education     Cyclobenzaprine (beatriz salvador AMBERLY za preen)   Brand Names: [place] Amrix; Fexmid   Brand Names: Radha AG-Cyclobenzaprine; APO-Cyclobenzaprine; Auro-Cyclobenzaprine; DOM-Cyclobenzaprine [DSC]; Flexeril; JAMP-Cyclobenzaprine; PMS-Cyclobenzaprine; GABRIELA-Cyclobenzaprine; TEVA-Cyclobenzaprine   What is this drug used for?   It is used to relax muscles.  What do I need to tell my doctor BEFORE I take this drug?   If you are allergic to this drug; any part of this drug; or any other drugs, foods, or substances. Tell your doctor about the allergy and what signs you had.  If you have any of these health problems: Heart block or other heartbeat that is not normal, heart failure (weak heart), liver disease, or an overactive thyroid gland.  If you have had a recent heart attack.  If you have taken certain drugs for depression or Parkinson's disease in the last 14 days. This includes isocarboxazid, phenelzine, tranylcypromine, selegiline, or rasagiline. Very high blood pressure may happen.  If you are taking any of these drugs: Linezolid or methylene blue.  This is not a list of all drugs or health problems that interact with this drug.  Tell your doctor and pharmacist about all of your drugs (prescription or OTC, natural products, vitamins) and health problems. You must check to make sure that it is safe for you to take this drug with all of your drugs and health problems. Do not start, stop, or change the dose of any drug without checking with your doctor.  What are some things I need to know or do while I take this drug?   All products:   Tell all of your health care providers that you take this drug. This includes your doctors, nurses, pharmacists, and dentists.  Avoid driving and doing other tasks or actions that call for you to be alert until you see how this drug affects you.  Be careful in hot weather or while being active. Drink lots of fluids to stop  fluid loss.  Talk with your doctor before you use alcohol, marijuana or other forms of cannabis, or prescription or OTC drugs that may slow your actions.  Do not take this drug for longer than you were told by your doctor.  This drug is used with rest, PT (physical therapy), pain drugs, and other therapies.  Tell your doctor if you are pregnant, plan on getting pregnant, or are breast-feeding. You will need to talk about the benefits and risks to you and the baby.  Tablets:   If you are 65 or older, use this drug with care. You could have more side effects.  Extended-release capsules:   Do not take this drug if you are 65 or older. Talk with your doctor.  What are some side effects that I need to call my doctor about right away?   WARNING/CAUTION: Even though it may be rare, some people may have very bad and sometimes deadly side effects when taking a drug. Tell your doctor or get medical help right away if you have any of the following signs or symptoms that may be related to a very bad side effect:  Signs of an allergic reaction, like rash; hives; itching; red, swollen, blistered, or peeling skin with or without fever; wheezing; tightness in the chest or throat; trouble breathing, swallowing, or talking; unusual hoarseness; or swelling of the mouth, face, lips, tongue, or throat.  Fast or abnormal heartbeat.  A severe and sometimes deadly problem called serotonin syndrome may happen if you take this drug with certain other drugs. Call your doctor right away if you have agitation; change in balance; confusion; hallucinations; fever; fast or abnormal heartbeat; flushing; muscle twitching or stiffness; seizures; shivering or shaking; sweating a lot; severe diarrhea, upset stomach, or throwing up; or severe headache.  What are some other side effects of this drug?   All drugs may cause side effects. However, many people have no side effects or only have minor side effects. Call your doctor or get medical help if any  of these side effects or any other side effects bother you or do not go away:  Feeling dizzy, sleepy, tired, or weak.  Dry mouth.  Constipation.  Upset stomach.  These are not all of the side effects that may occur. If you have questions about side effects, call your doctor. Call your doctor for medical advice about side effects.  You may report side effects to your national health agency.  You may report side effects to the FDA at 1-777.172.5245. You may also report side effects at https://www.fda.gov/medwatch.  How is this drug best taken?   Use this drug as ordered by your doctor. Read all information given to you. Follow all instructions closely.  All products:   Take with or without food.  Extended-release capsules:   Take this drug at the same time of day.  Swallow whole. Do not chew or crush.  If you cannot swallow this drug whole, you may sprinkle the contents on applesauce. If you do this, swallow the mixture right away without chewing.  Rinse mouth to make sure all contents have been swallowed.  Throw away any part of opened capsule left over after the dose is given.  What do I do if I miss a dose?   Tablets:   If you take this drug on a regular basis, take a missed dose as soon as you think about it.  If it is close to the time for your next dose, skip the missed dose and go back to your normal time.  Do not take 2 doses at the same time or extra doses.  Many times this drug is taken on an as needed basis. Do not take more often than told by the doctor.  Extended-release capsules:   Take a missed dose as soon as you think about it.  If it is close to the time for your next dose, skip the missed dose and go back to your normal time.  Do not take 2 doses at the same time or extra doses.  How do I store and/or throw out this drug?   Store at room temperature in a dry place. Do not store in a bathroom.  Keep all drugs in a safe place. Keep all drugs out of the reach of children and pets.  Throw away unused or   drugs. Do not flush down a toilet or pour down a drain unless you are told to do so. Check with your pharmacist if you have questions about the best way to throw out drugs. There may be drug take-back programs in your area.  General drug facts   If your symptoms or health problems do not get better or if they become worse, call your doctor.  Do not share your drugs with others and do not take anyone else's drugs.  Some drugs may have another patient information leaflet. If you have any questions about this drug, please talk with your doctor, nurse, pharmacist, or other health care provider.  Some drugs may have another patient information leaflet. Check with your pharmacist. If you have any questions about this drug, please talk with your doctor, nurse, pharmacist, or other health care provider.  If you think there has been an overdose, call your poison control center or get medical care right away. Be ready to tell or show what was taken, how much, and when it happened.  Consumer Information Use and Disclaimer   This generalized information is a limited summary of diagnosis, treatment, and/or medication information. It is not meant to be comprehensive and should be used as a tool to help the user understand and/or assess potential diagnostic and treatment options. It does NOT include all information about conditions, treatments, medications, side effects, or risks that may apply to a specific patient. It is not intended to be medical advice or a substitute for the medical advice, diagnosis, or treatment of a health care provider based on the health care provider's examination and assessment of a patient's specific and unique circumstances. Patients must speak with a health care provider for complete information about their health, medical questions, and treatment options, including any risks or benefits regarding use of medications. This information does not endorse any treatments or medications as safe,  effective, or approved for treating a specific patient. [employer] and its affiliates disclaim any warranty or liability relating to this information or the use thereof. The use of this information is governed by the Terms of Use, available at https://www.[employer].com/en/know/clinical-effectiveness-terms.  Last Reviewed Date   2024-05-09  Copyright   © 2024 [employer] and its affiliates and/or licensors. All rights reserved.  Patient Education     Tramadol (TRA ma dole)   Brand Names: [place] ConZip; Qdolo; Ultram [DSC]   Brand Names: Radha APO-Tramadol; AURO-Tramadol; Durela; JAMP Tramadol; JAMP-traMADol HCl; MAR-Tramadol; Ralivia; SANDOZ Tramadol [DSC]; TARO-Tramadol ER; Tridural; Ultram [DSC]; Zytram XL   Warning   For all patients taking this drug:   This is an opioid drug. Opioid drugs can put you at risk for drug use disorder. These can lead to overdose and death. You will be watched closely while taking this drug.  Severe breathing problems may happen with this drug. The risk is highest when you first start taking this drug or any time your dose is raised. These breathing problems can be deadly. Call your doctor right away if you have slow, shallow, or trouble breathing.  Even one dose of this drug may be deadly if it is taken by someone else or by accident, especially in children. If this drug is taken by someone else or by accident, get medical help right away.  Keep all drugs in a safe place. Keep all drugs out of the reach of children and pets.  Severe side effects have happened when opioid drugs were used with benzodiazepines, alcohol, marijuana, other forms of cannabis, or street drugs. This includes severe drowsiness, breathing problems, and death. Benzodiazepines include drugs like alprazolam, diazepam, and lorazepam. If you have questions, talk with the doctor.  Many drugs interact with this drug and can raise the chance of side effects like deadly breathing problems. Talk with your doctor  and pharmacist to make sure it is safe to use this drug with all of your drugs.  Get medical help right away if you feel very sleepy, very dizzy, or if you pass out. Caregivers or others need to get medical help right away if the patient does not respond, does not answer or react like normal, or will not wake up.  If you are pregnant or plan to get pregnant, talk with your doctor right away. Using this drug for a long time during pregnancy may lead to withdrawal in the  baby. Withdrawal in the  can be life-threatening if not treated.  Children:   This drug is not approved for use in children. Severe and sometimes deadly breathing problems have happened with tramadol in children. Sometimes, this happened after surgery to remove tonsils or adenoids and in children who were rapid metabolizers of tramadol. Do not give to a child younger than 12 years of age. Do not give to a child younger than 18 years of age who is very overweight, has certain health problems like sleep apnea or other lung or breathing problems, or has had surgery to remove tonsils or adenoids. If your child has been given this drug, ask the doctor about the benefits and risks.  All liquid products:   Be sure that you know how to measure your dose. Dosing errors can lead to accidental overdose and death. If you have any questions, talk with your doctor or pharmacist.  What is this drug used for?   It is used to manage pain when non-opioid pain drugs do not treat your pain well enough or you cannot take them.  Some products are used to manage pain only when daily pain treatment is needed for a long time.  What do I need to tell my doctor BEFORE I take this drug?   If you are allergic to this drug; any part of this drug; or any other drugs, foods, or substances. Tell your doctor about the allergy and what signs you had.  If you have any of these health problems: Lung or breathing problems like asthma, trouble breathing, or sleep apnea; high  levels of carbon dioxide in the blood; or stomach or bowel block or narrowing.  If you have any of these health problems: Kidney disease or liver disease.  If you have thoughts of suicide or if you have ever had alcohol or other drug abuse or dependence.  If you have been told by your doctor that you are a rapid metabolizer of some drugs.  If you have recently drunk a lot of alcohol or taken a drug that may slow your actions like phenobarbital or some pain drugs like oxycodone.  If you are taking carbamazepine.  If you are taking another drug that has the same drug in it.  If you are taking any of these drugs: Buprenorphine, butorphanol, linezolid, methylene blue, nalbuphine, or pentazocine.  If you have taken certain drugs for depression or Parkinson's disease in the last 14 days. This includes isocarboxazid, phenelzine, tranylcypromine, selegiline, or rasagiline. Very high blood pressure may happen.  If you are breast-feeding. Do not breast-feed while you take this drug.  This is not a list of all drugs or health problems that interact with this drug.  Tell your doctor and pharmacist about all of your drugs (prescription or OTC, natural products, vitamins) and health problems. You must check to make sure that it is safe for you to take this drug with all of your drugs and health problems. Do not start, stop, or change the dose of any drug without checking with your doctor.  What are some things I need to know or do while I take this drug?   Tell all of your health care providers that you take this drug. This includes your doctors, nurses, pharmacists, and dentists.  Avoid driving and doing other tasks or actions that call for you to be alert until you see how this drug affects you.  To lower the chance of feeling dizzy or passing out, rise slowly if you have been sitting or lying down. Be careful going up and down stairs.  Do not take more than what your doctor told you to take. Do not take more often or for  longer than you were told. Doing any of these things may raise the chance of severe side effects.  Do not take this drug with other strong pain drugs or if you are using a pain patch without talking to your doctor first.  If your pain gets worse, if you feel more sensitive to pain, or if you have new pain after you take this drug, call your doctor right away. Do not take more than ordered.  This drug may raise the chance of seizures. The chance may be higher in people who have certain health problems, use certain other drugs, or drink a lot of alcohol. Talk to your doctor to see if you have a greater chance of seizures while taking this drug.  If you have been taking this drug for a long time or at high doses, it may not work as well and you may need higher doses to get the same effect. This is known as tolerance. Call your doctor if this drug stops working well. Do not take more than ordered.  Low blood sugar has happened with this drug. Sometimes, people have had to go to the hospital. Call your doctor right away if you have signs of low blood sugar like dizziness, headache, feeling sleepy or weak, shaking, a fast heartbeat, confusion, hunger, or sweating.  Long-term or regular use of opioid drugs like this drug may lead to dependence. Lowering the dose or stopping this drug all of a sudden may cause a greater risk of withdrawal or other severe problems. Talk to your doctor before you lower the dose or stop this drug. You will need to follow your doctor’s instructions. Tell your doctor if you have more pain, mood changes, thoughts of suicide, or any other bad effects.  Do not take with alcohol or products that have alcohol. Unsafe and sometimes deadly effects may happen.  Long-term use of an opioid drug may lead to lower sex hormone levels. Call your doctor if you have a lowered interest in sex, fertility problems, no menstrual period, or ejaculation problems.  Taking an opioid drug like this drug may lead to a  rare but severe adrenal gland problem. Call your doctor right away if you feel very tired or weak, you pass out, or you have severe dizziness, very upset stomach, throwing up, or decreased appetite.  If you are 65 or older, use this drug with care. You could have more side effects.  What are some side effects that I need to call my doctor about right away?   WARNING/CAUTION: Even though it may be rare, some people may have very bad and sometimes deadly side effects when taking a drug. Tell your doctor or get medical help right away if you have any of the following signs or symptoms that may be related to a very bad side effect:  Signs of an allergic reaction, like rash; hives; itching; red, swollen, blistered, or peeling skin with or without fever; wheezing; tightness in the chest or throat; trouble breathing, swallowing, or talking; unusual hoarseness; or swelling of the mouth, face, lips, tongue, or throat. Rarely, some allergic reactions have been deadly.  Signs of depression, suicidal thoughts, emotional ups and downs, abnormal thinking, anxiety, or lack of interest in life.  Signs of low sodium levels like headache, trouble focusing, memory problems, feeling confused, weakness, seizures, or change in balance.  Severe dizziness or passing out.  Feeling confused.  Seizures.  Chest pain or pressure or a fast heartbeat.  Trouble passing urine.  Passing urine more often.  Trouble breathing, slow breathing, or shallow breathing.  Noisy breathing.  Breathing problems during sleep (sleep apnea).  Change in eyesight.  Severe constipation or stomach pain. These may be signs of a severe bowel problem.  A severe and sometimes deadly problem called serotonin syndrome may happen. The risk may be greater if you also take certain other drugs. Call your doctor right away if you have agitation; change in balance; confusion; hallucinations; fever; fast or abnormal heartbeat; flushing; muscle twitching or stiffness; seizures;  shivering or shaking; sweating a lot; severe diarrhea, upset stomach, or throwing up; or very bad headache.  A severe skin reaction (Parisi-Arnol syndrome/toxic epidermal necrolysis) may happen. It can cause severe health problems that may not go away, and sometimes death. Get medical help right away if you have signs like red, swollen, blistered, or peeling skin (with or without fever); red or irritated eyes; or sores in your mouth, throat, nose, or eyes.  What are some other side effects of this drug?   All drugs may cause side effects. However, many people have no side effects or only have minor side effects. Call your doctor or get medical help if any of these side effects or any other side effects bother you or do not go away:  Feeling dizzy, sleepy, tired, or weak.  Constipation, diarrhea, throwing up, or upset stomach.  Dry mouth.  Headache.  Itching.  Trouble sleeping.  Flushing.  Sweating a lot.  These are not all of the side effects that may occur. If you have questions about side effects, call your doctor. Call your doctor for medical advice about side effects.  You may report side effects to your national health agency.  You may report side effects to the FDA at 1-235.640.5512. You may also report side effects at https://www.fda.gov/medwatch.  How is this drug best taken?   Use this drug as ordered by your doctor. Read all information given to you. Follow all instructions closely.  All products:   Take by mouth only.  Do not inject or snort this drug. Doing any of these things can cause very bad side effects like trouble breathing and death from overdose.  All liquid products and tablets:   Take with or without food. Take with food if it causes an upset stomach.  All liquid products:   Measure liquid doses carefully. Use the measuring device that comes with this drug. If there is none, ask the pharmacist for a device to measure this drug.  Do not use a household teaspoon or tablespoon to measure this  drug. Doing so could lead to the dose being too high.  Liquid (suspension):   Shake well before use.  All extended-release products:   Swallow whole. Do not chew, break, crush, or dissolve before swallowing. Doing these things can cause very bad side effects and death.  Take this drug with or without food. Some products need to be taken the same way each time, either always with food or always without food. Be sure you know how to take your product with regard to food. If you are not sure how to take your product with regard to food, talk with your doctor or pharmacist.  Take this drug at the same time of day.  Do not use for fast pain relief or on an as needed basis.  Do not use for pain relief after surgery if you have not been taking drugs like this drug.  If you have trouble swallowing, talk with your doctor.  What do I do if I miss a dose?   Take a missed dose as soon as you think about it.  If it is close to the time for your next dose, skip the missed dose and go back to your normal time.  Do not take 2 doses at the same time or extra doses.  Some products are to be taken to ease pain as needed. If you are taking this drug as needed, do not take more often than told by your doctor.  How do I store and/or throw out this drug?   All products:   Store at room temperature in a dry place. Do not store in a bathroom.  Store this drug in a safe place where children cannot see or reach it, and where other people cannot get to it. A locked box or area may help keep this drug safe. Keep all drugs away from pets.  Throw away unused or  drugs. Do not flush down a toilet or pour down a drain unless you are told to do so. Check with your pharmacist if you have questions about the best way to throw out drugs. There may be drug take-back programs in your area.  Liquid (suspension):   Be sure you know how long you can store this drug before you need to throw it away.  General drug facts   If your symptoms or health  problems do not get better or if they become worse, call your doctor.  Do not share your drugs with others and do not take anyone else's drugs.  Some drugs may have another patient information leaflet. If you have any questions about this drug, please talk with your doctor, nurse, pharmacist, or other health care provider.  This drug comes with an extra patient fact sheet called a Medication Guide. Read it with care. Read it again each time this drug is refilled. If you have any questions about this drug, please talk with the doctor, pharmacist, or other health care provider.  A drug called naloxone can be used to help treat an overdose of this drug. Talk with your doctor or pharmacist about how to get or use naloxone. If you think there has been an overdose, get medical care right away even if naloxone has been used. Be ready to tell or show what was taken, how much, and when it happened.  If you think there has been an overdose, call your poison control center or get medical care right away. Be ready to tell or show what was taken, how much, and when it happened.  Consumer Information Use and Disclaimer   This generalized information is a limited summary of diagnosis, treatment, and/or medication information. It is not meant to be comprehensive and should be used as a tool to help the user understand and/or assess potential diagnostic and treatment options. It does NOT include all information about conditions, treatments, medications, side effects, or risks that may apply to a specific patient. It is not intended to be medical advice or a substitute for the medical advice, diagnosis, or treatment of a health care provider based on the health care provider's examination and assessment of a patient's specific and unique circumstances. Patients must speak with a health care provider for complete information about their health, medical questions, and treatment options, including any risks or benefits regarding use of  medications. This information does not endorse any treatments or medications as safe, effective, or approved for treating a specific patient. [employer] and its affiliates disclaim any warranty or liability relating to this information or the use thereof. The use of this information is governed by the Terms of Use, available at https://www.[employer].com/en/know/clinical-effectiveness-terms.  Last Reviewed Date   2024-01-16  Copyright   © 2024 [employer] and its affiliates and/or licensors. All rights reserved.

## 2024-10-08 NOTE — PROGRESS NOTES
Assessment/Plan:    1. Chest wall discomfort  -     XR ribs bilateral 4+ vw w pa chest; Future; Expected date: 10/08/2024  -     cyclobenzaprine (FLEXERIL) 10 mg tablet; Take 1 tablet (10 mg total) by mouth daily at bedtime for 20 days  -     traMADol (Ultram) 50 mg tablet; Take 1 tablet (50 mg total) by mouth 2 (two) times a day as needed for moderate pain  2. Rib pain  -     XR ribs bilateral 4+ vw w pa chest; Future; Expected date: 10/08/2024  -     cyclobenzaprine (FLEXERIL) 10 mg tablet; Take 1 tablet (10 mg total) by mouth daily at bedtime for 20 days  -     traMADol (Ultram) 50 mg tablet; Take 1 tablet (50 mg total) by mouth 2 (two) times a day as needed for moderate pain      Patient Instructions:  Do not take muscle relaxant and tramadol at same time  Return if symptoms worsen or fail to improve.      Future Appointments   Date Time Provider Department Center   10/8/2024  1:30 PM [facility] 1 [facility]   11/11/2024 10:00 AM MD TONG De La Cruz [facility]           Subjective:      Patient ID: Jj Jones is a 64 y.o. male.    Chief Complaint   Patient presents with    Back Pain     Sas/cma         Vitals:  /78   Pulse 76   Temp 98.3 °F (36.8 °C)   Resp 18   Wt 73.5 kg (162 lb)   BMI 23.24 kg/m²     Patient stated that started with upper back pain couple of weeks ago and stated that started doing weight lifting and working out in gym and did for week and then started with discomfort on bilateral rib cage lateral sides about 3 weeks ago and stopped working out in gym. Denies any chest pain, sob. Quit smoking in 2009. On prednisone but no relief with pain.      Back Pain                The following portions of the patient's history were reviewed and updated as appropriate: allergies, current medications, past family history, past medical history, past social history, past surgical history and problem list.      Review of Systems   HENT: Negative.     Respiratory:   Negative for apnea, cough, choking, chest tightness, shortness of breath, wheezing and stridor.         As noted in HPI       Cardiovascular: Negative.    Musculoskeletal:  Positive for back pain.         Objective:    Social History     Tobacco Use   Smoking Status Former    Average packs/day: 1 pack/day for 27.0 years (27.0 ttl pk-yrs)    Types: Cigarettes    Start date: 1982    Passive exposure: Past   Smokeless Tobacco Never       Allergies: No Known Allergies      Current Outpatient Medications   Medication Sig Dispense Refill    cholecalciferol (VITAMIN D3) 1,000 units tablet Take 1,000 Units by mouth daily      clonazePAM (KlonoPIN) 0.5 MG disintegrating tablet Take 1 tablet (0.5 mg total) by mouth daily as needed for anxiety 30 tablet 3    cyclobenzaprine (FLEXERIL) 10 mg tablet Take 1 tablet (10 mg total) by mouth daily at bedtime for 20 days 20 tablet 0    Digestive Enzymes (DIGESTIVE ENZYME PO) Take by mouth in the morning      ketoconazole (NIZORAL) 2 % cream Apply topically daily 60 g 0    multivitamin (THERAGRAN) TABS Take 1 tablet by mouth daily Oral liquid      predniSONE 10 mg tablet Take 4 tablets (40 mg total) by mouth in the morning for 5 days, THEN 3 tablets (30 mg total) in the morning for 5 days, THEN 2 tablets (20 mg total) in the morning for 5 days, THEN 1 tablet (10 mg total) daily for 5 days. Take 5 for 4 days, then take 4 daily for 4 days, then 3 daily for 4 days, then 2 daily for 4 days, then 1 daily for 4 days. Take with food.. 50 tablet 0    traMADol (Ultram) 50 mg tablet Take 1 tablet (50 mg total) by mouth 2 (two) times a day as needed for moderate pain 10 tablet 0    VITAMIN E BLEND PO Take by mouth       No current facility-administered medications for this visit.          Physical Exam  Constitutional:       Appearance: Normal appearance.   HENT:      Head: Normocephalic.      Nose: Nose normal.   Eyes:      Conjunctiva/sclera: Conjunctivae normal.   Cardiovascular:      Rate and  Rhythm: Normal rate and regular rhythm.      Heart sounds: Normal heart sounds.   Pulmonary:      Effort: Pulmonary effort is normal.      Breath sounds: Normal breath sounds.   Chest:       Musculoskeletal:         General: No swelling or tenderness. Normal range of motion.   Skin:     General: Skin is warm and dry.      Findings: No rash.   Neurological:      Mental Status: He is alert and oriented to person, place, and time.   Psychiatric:         Mood and Affect: Mood normal.         Behavior: Behavior normal.         Thought Content: Thought content normal.         Judgment: Judgment normal.                     MEI Hutchins

## 2024-10-22 ENCOUNTER — TELEPHONE (OUTPATIENT)
Dept: GASTROENTEROLOGY | Facility: AMBULARY SURGERY CENTER | Age: 64
End: 2024-10-22

## 2024-10-27 ENCOUNTER — ANESTHESIA EVENT (OUTPATIENT)
Dept: ANESTHESIOLOGY | Facility: HOSPITAL | Age: 64
End: 2024-10-27

## 2024-10-27 ENCOUNTER — ANESTHESIA (OUTPATIENT)
Dept: ANESTHESIOLOGY | Facility: HOSPITAL | Age: 64
End: 2024-10-27

## 2024-10-28 ENCOUNTER — NURSE TRIAGE (OUTPATIENT)
Age: 64
End: 2024-10-28

## 2024-10-28 NOTE — TELEPHONE ENCOUNTER
" JENS FROM [facility] CALLING FOR ADDITIONAL CLINICAL INFORMATION FOR ENTEVERETTIO. [phone], TRACKING # [id number]        Answer Assessment - Initial Assessment Questions  1. REASON FOR CALL: \"What is the main reason for your call?\" or \"How can I best help you?\"      [facility] FROM [facility] CALLING FOR ADDITIONAL CLINICAL INFORMATION FOR ENTEVERETTIO. [phone], TRACKING # [id number]    Protocols used: Information Only Call - No Triage-Adult-[place]    "

## 2024-10-29 ENCOUNTER — TELEPHONE (OUTPATIENT)
Dept: GASTROENTEROLOGY | Facility: CLINIC | Age: 64
End: 2024-10-29

## 2024-10-29 NOTE — TELEPHONE ENCOUNTER
Contacted [employer]; spoke with Crystal.  She stated the auth is still in review and asked that I fax over patients recent TB along with documentation of other medication that pt has tried.  Faxed documents to [phone]

## 2024-10-29 NOTE — TELEPHONE ENCOUNTER
lmom confirming pt's colonoscopy scheduled on 11/11/24 at [facility] with Dr Villeda .  Informed [facility] would be calling this pt with the arrival time.  Informed of clear liquid diet day prior as well as the bowel cleansing preparation.  Informed would need a  the day of the procedure due to being under sedation. I asked pt to please call back if has not received instructions or if has any questions.

## 2024-10-30 NOTE — TELEPHONE ENCOUNTER
Henri from [facility] calling in, he needs additional clinical information.   Please call back at [phone]     Indication for treating pt with Entyvio-  ulcerative proctitis is not covered and would need peer to peer.   2.  Confirmation that entyvio is a single agent and not taken with another biologic   3.  Confirm conventional therapy was taken for at least 3 month- Ex: mesalamine

## 2024-11-01 NOTE — TELEPHONE ENCOUNTER
Luis Villeda,    Please see below.  Advise if you will like to continue with peer to peer or try to get another medication approved.    Henri from [facility] calling in, he needs additional clinical information.   Please call back at [phone]      Indication for treating pt with Entyvio-  ulcerative proctitis is not covered and would need peer to peer.   2.  Confirmation that entyvio is a single agent and not taken with another biologic   3.  Confirm conventional therapy was taken for at least 3 month- Ex: mesalamine

## 2024-11-01 NOTE — TELEPHONE ENCOUNTER
I called the pt to schedule a fu ov for a medication check.  Left a message on pts vm to return the call.

## 2024-11-06 NOTE — TELEPHONE ENCOUNTER
Hi Dr. Villeda,    Just an FYI.  The Entyvio was denied.  I know the pt has to be seen for f/u.

## 2024-11-09 RX ORDER — SODIUM CHLORIDE, SODIUM LACTATE, POTASSIUM CHLORIDE, CALCIUM CHLORIDE 600; 310; 30; 20 MG/100ML; MG/100ML; MG/100ML; MG/100ML
75 INJECTION, SOLUTION INTRAVENOUS CONTINUOUS
OUTPATIENT
Start: 2024-11-09

## 2024-11-11 DIAGNOSIS — F41.0 PANIC ATTACK: ICD-10-CM

## 2024-11-11 DIAGNOSIS — F41.9 ANXIETY: ICD-10-CM

## 2024-11-11 RX ORDER — CLONAZEPAM 0.5 MG/1
0.5 TABLET, ORALLY DISINTEGRATING ORAL DAILY PRN
Qty: 30 TABLET | Refills: 3 | Status: SHIPPED | OUTPATIENT
Start: 2024-11-11

## 2024-12-04 ENCOUNTER — TELEPHONE (OUTPATIENT)
Dept: GASTROENTEROLOGY | Facility: CLINIC | Age: 64
End: 2024-12-04

## 2024-12-04 NOTE — TELEPHONE ENCOUNTER
Lmm for pt to call back and schedule ov. At the moment there were a few office appts available in [place]. Sb

## 2024-12-18 NOTE — TELEPHONE ENCOUNTER
1/13/24 [facility] release    Please call patient in regards to the message that he sent me regarding his inflammatory bowel disease.  I am concerned about how much prednisone he is requiring.  Also he is overdue for blood work.  Please have him schedule appointment and get the lab work that was ordered in February done  Thank you,  Yvrose Ramirez, DO

## 2025-01-21 DIAGNOSIS — K52.9 INFLAMMATORY BOWEL DISEASE: Primary | ICD-10-CM

## 2025-01-21 RX ORDER — PREDNISONE 10 MG/1
TABLET ORAL
Qty: 40 TABLET | Refills: 0 | Status: SHIPPED | OUTPATIENT
Start: 2025-01-21

## 2025-02-12 ENCOUNTER — OFFICE VISIT (OUTPATIENT)
Dept: FAMILY MEDICINE CLINIC | Facility: CLINIC | Age: 65
End: 2025-02-12
Payer: COMMERCIAL

## 2025-02-12 VITALS
HEART RATE: 72 BPM | DIASTOLIC BLOOD PRESSURE: 86 MMHG | WEIGHT: 181 LBS | HEIGHT: 70 IN | SYSTOLIC BLOOD PRESSURE: 152 MMHG | RESPIRATION RATE: 18 BRPM | BODY MASS INDEX: 25.91 KG/M2 | TEMPERATURE: 97.3 F

## 2025-02-12 DIAGNOSIS — K51.211 ULCERATIVE PROCTITIS WITH RECTAL BLEEDING (HCC): ICD-10-CM

## 2025-02-12 DIAGNOSIS — I10 BENIGN ESSENTIAL HYPERTENSION: ICD-10-CM

## 2025-02-12 DIAGNOSIS — M54.50 ACUTE RIGHT-SIDED LOW BACK PAIN WITHOUT SCIATICA: Primary | ICD-10-CM

## 2025-02-12 PROBLEM — N52.9 VASCULOGENIC ERECTILE DYSFUNCTION: Status: RESOLVED | Noted: 2021-07-08 | Resolved: 2025-02-12

## 2025-02-12 PROCEDURE — 99214 OFFICE O/P EST MOD 30 MIN: CPT | Performed by: FAMILY MEDICINE

## 2025-02-12 RX ORDER — CYCLOBENZAPRINE HCL 10 MG
10 TABLET ORAL
Qty: 30 TABLET | Refills: 0 | Status: SHIPPED | OUTPATIENT
Start: 2025-02-12

## 2025-02-12 RX ORDER — METHYLPREDNISOLONE 4 MG/1
TABLET ORAL
Qty: 21 TABLET | Refills: 0 | Status: SHIPPED | OUTPATIENT
Start: 2025-02-12 | End: 2025-02-18

## 2025-02-12 RX ORDER — SODIUM, POTASSIUM,MAG SULFATES 17.5-3.13G
SOLUTION, RECONSTITUTED, ORAL ORAL
COMMUNITY
Start: 2025-01-16

## 2025-02-12 RX ORDER — MESALAMINE 1000 MG/1
SUPPOSITORY RECTAL
COMMUNITY
Start: 2025-01-10

## 2025-02-12 NOTE — PROGRESS NOTES
Name: Jj Jones      : 1960      MRN: [id number]  Encounter Provider: Julio Cesar Lunsford DO  Encounter Date: 2025   Encounter department: [facility]  :  Assessment & Plan  Acute right-sided low back pain without sciatica  Ice and rom  Steroid risks aware  Fall risk with flexeril  Discouraged use with tramadol due to seizure risk  PT if no better  Orders:    methylPREDNISolone 4 MG tablet therapy pack; Use as directed on package    cyclobenzaprine (FLEXERIL) 10 mg tablet; Take 1 tablet (10 mg total) by mouth daily at bedtime    Ambulatory Referral to Comprehensive Spine Program; Future    Ulcerative proctitis with rectal bleeding (HCC)  Stable on suppositories  Avoids nsaids       Benign essential hypertension  High day, suspect pain response, f/u in future          History of Present Illness   HPI  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   Genitourinary:  Negative for difficulty urinating.   Musculoskeletal:  Positive for back pain.     Family History   Problem Relation Age of Onset    No Known Problems Mother     Bone cancer Father     Mental illness Neg Hx       Social History     Tobacco Use    Smoking status: Former     Average packs/day: 1 pack/day for 27.0 years (27.0 ttl pk-yrs)     Types: Cigarettes     Start date:      Passive exposure: Past    Smokeless tobacco: Never   Vaping Use    Vaping status: Never Used   Substance Use Topics     "Alcohol use: Yes     Comment: occasionally 1-2 drinks a month     Drug use: Never      Current Outpatient Medications   Medication Instructions    cholecalciferol (VITAMIN D3) 1,000 Units, Daily    clonazePAM (KLONOPIN) 0.5 mg, Oral, Daily PRN    cyclobenzaprine (FLEXERIL) 10 mg, Oral, Daily at bedtime    Digestive Enzymes (DIGESTIVE ENZYME PO) Daily    ketoconazole (NIZORAL) 2 % cream Topical, Daily    mesalamine (CANASA) 1,000 mg suppository INSERT 1 SUPPOSITORY BY RECTAL ROUTE  EVERY DAY AT BEDTIME    methylPREDNISolone 4 MG tablet therapy pack Use as directed on package    multivitamin (THERAGRAN) TABS 1 tablet, Daily    Na Sulfate-K Sulfate-Mg Sulf 17.5-3.13-1.6 GM/177ML SOLN     predniSONE 10 mg tablet Take 4 daily for 4 days, then 3 daily for 4 days, then 2 daily for 4 days, then 1 daily for 4 days. Take with food.    VITAMIN E BLEND PO Take by mouth     >>>>>>>>  Return if symptoms worsen or fail to improve.  >>>>>>>>    Visit Vitals  /86   Pulse 72   Temp (!) 97.3 °F (36.3 °C)   Resp 18   Ht 5' 10\" (1.778 m)   Wt 82.1 kg (181 lb)   BMI 25.97 kg/m²   Smoking Status Former   BSA 2 m²        Objective   /86   Pulse 72   Temp (!) 97.3 °F (36.3 °C)   Resp 18   Ht 5' 10\" (1.778 m)   Wt 82.1 kg (181 lb)   BMI 25.97 kg/m²      Physical Exam  Vitals and nursing note reviewed.   Constitutional:       General: He is not in acute distress.     Appearance: He is well-developed. He is not ill-appearing.   HENT:      Head: Normocephalic.      Right Ear: Tympanic membrane normal.      Left Ear: Tympanic membrane normal.      Mouth/Throat:      Mouth: Mucous membranes are moist.   Eyes:      Conjunctiva/sclera: Conjunctivae normal.   Cardiovascular:      Rate and Rhythm: Normal rate and regular rhythm.   Pulmonary:      Effort: Pulmonary effort is normal. No respiratory distress.      Breath sounds: No wheezing.   Abdominal:      Palpations: Abdomen is soft.      Tenderness: There is no abdominal " tenderness.   Musculoskeletal:         General: Tenderness present. No swelling or deformity.      Cervical back: Neck supple.      Right lower leg: No edema.      Left lower leg: No edema.      Comments: SLR neg  Sitting root neg  Hip rom wnl  No LE weakness  Tender PSIS area   Skin:     General: Skin is warm and dry.   Neurological:      Mental Status: He is alert.      Motor: No weakness.      Gait: Gait normal.   Psychiatric:         Mood and Affect: Mood normal.         Behavior: Behavior normal.         Thought Content: Thought content normal.

## 2025-02-13 ENCOUNTER — TELEPHONE (OUTPATIENT)
Dept: PHYSICAL THERAPY | Facility: OTHER | Age: 65
End: 2025-02-13

## 2025-02-20 NOTE — TELEPHONE ENCOUNTER
Call placed to the patient regarding the referral entered for  [facility]. Message was left for the patient.     Contact information and hours of operation for CS provided.  Requested  the patient to CB to discuss CSP services when available.    This is the second attempt to reach the patient.  Referral deferred for f/u attempt per protocol.

## 2025-02-25 ENCOUNTER — RA CDI HCC (OUTPATIENT)
Dept: OTHER | Facility: HOSPITAL | Age: 65
End: 2025-02-25

## 2025-02-25 NOTE — PROGRESS NOTES
HCC coding opportunities       Chart reviewed, no opportunity found: CHART REVIEWED, NO OPPORTUNITY FOUND        Patients Insurance        Commercial Insurance: [employer]

## 2025-03-04 ENCOUNTER — OFFICE VISIT (OUTPATIENT)
Dept: FAMILY MEDICINE CLINIC | Facility: CLINIC | Age: 65
End: 2025-03-04
Payer: COMMERCIAL

## 2025-03-04 VITALS
HEART RATE: 70 BPM | BODY MASS INDEX: 26.14 KG/M2 | WEIGHT: 182.6 LBS | HEIGHT: 70 IN | TEMPERATURE: 97.2 F | DIASTOLIC BLOOD PRESSURE: 86 MMHG | RESPIRATION RATE: 16 BRPM | SYSTOLIC BLOOD PRESSURE: 132 MMHG

## 2025-03-04 DIAGNOSIS — I10 BENIGN ESSENTIAL HYPERTENSION: ICD-10-CM

## 2025-03-04 DIAGNOSIS — Z79.899 MEDICATION MANAGEMENT: ICD-10-CM

## 2025-03-04 DIAGNOSIS — F17.211 NICOTINE DEPENDENCE, CIGARETTES, IN REMISSION: ICD-10-CM

## 2025-03-04 DIAGNOSIS — F41.0 PANIC ATTACK: ICD-10-CM

## 2025-03-04 DIAGNOSIS — F41.9 ANXIETY: ICD-10-CM

## 2025-03-04 DIAGNOSIS — K51.211 ULCERATIVE PROCTITIS WITH RECTAL BLEEDING (HCC): Primary | ICD-10-CM

## 2025-03-04 PROCEDURE — 93000 ELECTROCARDIOGRAM COMPLETE: CPT | Performed by: FAMILY MEDICINE

## 2025-03-04 PROCEDURE — 99213 OFFICE O/P EST LOW 20 MIN: CPT | Performed by: FAMILY MEDICINE

## 2025-03-04 RX ORDER — CLONAZEPAM 0.5 MG/1
0.5 TABLET, ORALLY DISINTEGRATING ORAL DAILY PRN
Qty: 30 TABLET | Refills: 3 | Status: SHIPPED | OUTPATIENT
Start: 2025-03-04

## 2025-03-04 NOTE — PROGRESS NOTES
Name: Jj Jones      : 1960      MRN: [id number]  Encounter Provider: Yvrose Ramirez DO  Encounter Date: 3/4/2025   Encounter department: [facility]  :  Assessment & Plan  Ulcerative proctitis with rectal bleeding (HCC)  Currently improved with symptom control  Will continue management with gastroenterology       Nicotine dependence, cigarettes, in remission    Orders:  •  CT lung screening program; Future    Benign essential hypertension  Controlled with weight loss        Medication management    Orders:  •  POCT ECG    Anxiety  We discussed clonazepam long term risk including risk of dementia   He is going to try not taking it as often   Orders:  •  clonazePAM (KlonoPIN) 0.5 MG disintegrating tablet; Take 1 tablet (0.5 mg total) by mouth daily as needed for anxiety    Panic attack    Orders:  •  clonazePAM (KlonoPIN) 0.5 MG disintegrating tablet; Take 1 tablet (0.5 mg total) by mouth daily as needed for anxiety      Return in about 6 months (around 2025) for Annual physical.    Lung Cancer Screening Shared Decision Making: I discussed with him that he is a candidate for lung cancer CT screening.     The following Shared Decision-Making points were covered:  Benefits of screening were discussed, including the rates of reduction in death from lung cancer and other causes.  Harms of screening were reviewed, including false positive tests, radiation exposure levels, risks of invasive procedures, risks of complications of screening, and risk of overdiagnosis.  I counseled on the importance of adherence to annual lung cancer LDCT screening, impact of co-morbidities, and ability or willingness to undergo diagnosis and treatment.  I counseled on the importance of maintaining abstinence as a former smoker or was counseled on the importance of smoking cessation if a current smoker    Review of Eligibility Criteria: He meets all of the criteria for Lung Cancer Screening.   - He is 64 y.o.   -  "He has 20 pack year tobacco history and is a current smoker or has quit within the past 15 years  - He presents no signs or symptoms of lung cancer    After discussion, the patient decided to elect lung cancer screening.    Return in about 6 months (around 9/4/2025) for Annual physical.  History of Present Illness   He has been doing better with his herbal supplements.   Had a colonoscopy done at [facility] this past year. Dr. Gomez is looking at different medication for his inflammatory bowel disease.  He needs an EKG done for monitoring before starting the medication.    He has not needed the prednisone I prescribed for him recently.  He is holding on it to just in case.      Review of Systems    Objective   /86   Pulse 70   Temp (!) 97.2 °F (36.2 °C)   Resp 16   Ht 5' 10\" (1.778 m)   Wt 82.8 kg (182 lb 9.6 oz)   BMI 26.20 kg/m²      Physical Exam  Vitals and nursing note reviewed.   Constitutional:       General: He is not in acute distress.     Appearance: He is well-developed.   HENT:      Right Ear: Tympanic membrane normal.      Left Ear: Tympanic membrane normal.   Cardiovascular:      Rate and Rhythm: Normal rate and regular rhythm.      Heart sounds: No murmur heard.  Pulmonary:      Effort: Pulmonary effort is normal. No respiratory distress.      Breath sounds: Normal breath sounds.   Neurological:      Mental Status: He is alert.   Psychiatric:         Mood and Affect: Mood normal.         "

## 2025-03-04 NOTE — ASSESSMENT & PLAN NOTE
We discussed clonazepam long term risk including risk of dementia   He is going to try not taking it as often   Orders:  •  clonazePAM (KlonoPIN) 0.5 MG disintegrating tablet; Take 1 tablet (0.5 mg total) by mouth daily as needed for anxiety

## 2025-03-05 ENCOUNTER — TELEPHONE (OUTPATIENT)
Dept: ADMINISTRATIVE | Facility: OTHER | Age: 65
End: 2025-03-05

## 2025-03-05 NOTE — LETTER
Procedure Request Form: Colonoscopy      Date Requested: 25  Patient: Jj Jones  Patient : 1960   Referring Provider: Yvrose Ramirez, DO        Date of Procedure ______________________________       The above patient has informed us that they have completed their   most recent Colonoscopy at your facility. Please complete   this form and attach all corresponding procedure reports/results.    Comments __________________________________________________________  ____________________________________________________________________  ____________________________________________________________________  ____________________________________________________________________    Facility Completing Procedure _________________________________________    Form Completed By (print name) _______________________________________      Signature __________________________________________________________      These reports are needed for  compliance.    Please fax this completed form and a copy of the procedure report to our office located at [address] [place] [address] as soon as possible to Fax [phone] zoltan Lawrence: Phone [phone]    We thank you for your assistance in treating our mutual patient.

## 2025-03-05 NOTE — TELEPHONE ENCOUNTER
----- Message from Yvrose Ramirez DO sent at 3/4/2025  1:43 PM EST -----  03/04/25 1:43 PM    Hello, our patient Jj Jones has had CRC: Colonoscopy completed/performed. Please assist in updating the patient chart by making an External outreach to Dr. Gomez facility located in [place], [place]. The date of service is 2024.    Thank you,  Yvrose Ramirez DO  [facility]

## 2025-03-05 NOTE — TELEPHONE ENCOUNTER
Upon review of the In Basket request and the patient's chart, initial outreach has been made via fax to facility. Please see Contacts section for details.     Thank you  Howard De Luna MA

## 2025-03-07 NOTE — TELEPHONE ENCOUNTER
Upon review of the In Basket request we were able to locate, review, and update the patient chart as requested for CRC: Colonoscopy.    Any additional questions or concerns should be emailed to the Practice Liaisons via the appropriate education email address, please do not reply via In Basket.    Thank you  Howard De Luna MA   [facility]

## 2025-03-18 DIAGNOSIS — F41.0 PANIC ATTACK: ICD-10-CM

## 2025-03-18 DIAGNOSIS — F41.9 ANXIETY: ICD-10-CM

## 2025-03-18 RX ORDER — CLONAZEPAM 0.5 MG/1
0.5 TABLET, ORALLY DISINTEGRATING ORAL DAILY PRN
Qty: 30 TABLET | OUTPATIENT
Start: 2025-03-18

## 2025-06-05 DIAGNOSIS — K52.9 INFLAMMATORY BOWEL DISEASE: ICD-10-CM

## 2025-06-06 ENCOUNTER — PATIENT MESSAGE (OUTPATIENT)
Dept: FAMILY MEDICINE CLINIC | Facility: CLINIC | Age: 65
End: 2025-06-06

## 2025-06-06 DIAGNOSIS — K52.9 INFLAMMATORY BOWEL DISEASE: Primary | ICD-10-CM

## 2025-06-06 RX ORDER — PREDNISONE 10 MG/1
TABLET ORAL
Qty: 20 TABLET | Refills: 0 | Status: SHIPPED | OUTPATIENT
Start: 2025-06-06 | End: 2025-06-16

## 2025-06-08 RX ORDER — PREDNISONE 10 MG/1
TABLET ORAL
Qty: 40 TABLET | Refills: 0 | Status: SHIPPED | OUTPATIENT
Start: 2025-06-08

## 2025-07-18 ENCOUNTER — TELEPHONE (OUTPATIENT)
Age: 65
End: 2025-07-18

## 2025-07-18 NOTE — TELEPHONE ENCOUNTER
Patient called in stating he would like to have a copy of his EKG from March sent over to his gastroenterologist doctor.  Gastro doctor is wanting to put him on a prescription, but needs the results first.    Dr. Gomez  Fax #:  [phone]    Thank you,

## 2025-08-21 DIAGNOSIS — F41.0 PANIC ATTACK: ICD-10-CM

## 2025-08-21 DIAGNOSIS — F41.9 ANXIETY: ICD-10-CM

## 2025-08-22 RX ORDER — CLONAZEPAM 0.5 MG/1
0.5 TABLET, ORALLY DISINTEGRATING ORAL DAILY PRN
Qty: 30 TABLET | Refills: 0 | Status: SHIPPED | OUTPATIENT
Start: 2025-08-22